# Patient Record
Sex: MALE | Race: BLACK OR AFRICAN AMERICAN | Employment: FULL TIME | URBAN - METROPOLITAN AREA
[De-identification: names, ages, dates, MRNs, and addresses within clinical notes are randomized per-mention and may not be internally consistent; named-entity substitution may affect disease eponyms.]

---

## 2018-02-27 ENCOUNTER — OFFICE VISIT (OUTPATIENT)
Dept: FAMILY MEDICINE | Facility: CLINIC | Age: 33
End: 2018-02-27
Payer: COMMERCIAL

## 2018-02-27 VITALS
TEMPERATURE: 97.2 F | HEART RATE: 75 BPM | DIASTOLIC BLOOD PRESSURE: 75 MMHG | WEIGHT: 166 LBS | BODY MASS INDEX: 26.06 KG/M2 | HEIGHT: 67 IN | SYSTOLIC BLOOD PRESSURE: 120 MMHG | OXYGEN SATURATION: 100 %

## 2018-02-27 DIAGNOSIS — R10.32 ABDOMINAL PAIN, LEFT LOWER QUADRANT: Primary | ICD-10-CM

## 2018-02-27 PROCEDURE — 99213 OFFICE O/P EST LOW 20 MIN: CPT | Performed by: FAMILY MEDICINE

## 2018-02-27 RX ORDER — POLYETHYLENE GLYCOL 3350 17 G/17G
1 POWDER, FOR SOLUTION ORAL DAILY
Qty: 510 G | Refills: 1 | Status: SHIPPED | OUTPATIENT
Start: 2018-02-27 | End: 2019-10-25

## 2018-02-27 ASSESSMENT — PAIN SCALES - GENERAL: PAINLEVEL: WORST PAIN (10)

## 2018-02-27 NOTE — MR AVS SNAPSHOT
"              After Visit Summary   2018    Steven Rubio    MRN: [id number]           Patient Information     Date Of Birth          1985        Visit Information        Provider Department      2018 10:40 AM Colin Denton MD [facility]        Today's Diagnoses     Abdominal pain, left lower quadrant    -  1       Follow-ups after your visit        Who to contact     If you have questions or need follow up information about today's clinic visit or your schedule please contact [facility] directly at [phone].  Normal or non-critical lab and imaging results will be communicated to you by [employer], letter or phone within 4 business days after the clinic has received the results. If you do not hear from us within 7 days, please contact the clinic through [employer] or phone. If you have a critical or abnormal lab result, we will notify you by phone as soon as possible.  Submit refill requests through [employer] or call your pharmacy and they will forward the refill request to us. Please allow 3 business days for your refill to be completed.          Additional Information About Your Visit        MyChart Information     [employer] lets you send messages to your doctor, view your test results, renew your prescriptions, schedule appointments and more. To sign up, go to [email or web address] . Click on \"Log in\" on the left side of the screen, which will take you to the Welcome page. Then click on \"Sign up Now\" on the right side of the page.     You will be asked to enter the access code listed below, as well as some personal information. Please follow the directions to create your username and password.     Your access code is: [email or web address]  Expires: 2018 12:20 PM     Your access code will  in 90 days. If you need help or a new code, please call your [facility] or [phone].        Care EveryWhere ID     This is your Care EveryWhere ID. " "This could be used by other organizations to access your [place] medical records  [id number]        Your Vitals Were     Pulse Temperature Height Pulse Oximetry BMI (Body Mass Index)       75 97.2  F (36.2  C) (Oral) 5' 7.25\" (1.708 m) 100% 25.81 kg/m2        Blood Pressure from Last 3 Encounters:   02/27/18 120/75    Weight from Last 3 Encounters:   02/27/18 166 lb (75.3 kg)              Today, you had the following     No orders found for display         Today's Medication Changes          These changes are accurate as of 2/27/18 12:20 PM.  If you have any questions, ask your nurse or doctor.               Start taking these medicines.        Dose/Directions    polyethylene glycol powder   Commonly known as:  MIRALAX   Used for:  Abdominal pain, left lower quadrant   Started by:  Colin Denton MD        Dose:  1 capful   Take 17 g (1 capful) by mouth daily   Quantity:  510 g   Refills:  1            Where to get your medicines      These medications were sent to [facility] [address] [place] [address]     Phone:  [phone]     polyethylene glycol powder                Primary Care Provider Office Phone # Fax #    [facility] [phone]       [address] [place] [address]        Equal Access to Services     ARNAV INMAN AH: Norberto torrez hadasho Soomaali, waaxda luqadaha, qaybta kaalmada adeegyada, alondra tong. So [facility] [phone].    ATENCIÓN: Si habla español, tiene a horta disposición servicios gratuitos de asistencia lingüística. Llame al [phone].    We comply with applicable federal civil rights laws and [place] laws. We do not discriminate on the basis of race, color, national origin, age, disability, sex, sexual orientation, or gender identity.            Thank you!     Thank you for choosing [facility]  for your care. " Our goal is always to provide you with excellent care. Hearing back from our patients is one way we can continue to improve our services. Please take a few minutes to complete the written survey that you may receive in the mail after your visit with us. Thank you!             Your Updated Medication List - Protect others around you: Learn how to safely use, store and throw away your medicines at www.disposemymeds.org.          This list is accurate as of 2/27/18 12:20 PM.  Always use your most recent med list.                   Brand Name Dispense Instructions for use Diagnosis    latanoprost 0.005 % ophthalmic solution    XALATAN    1 Bottle    Place 1 drop Into the left eye At Bedtime.    Open-angle glaucoma       polyethylene glycol powder    MIRALAX    510 g    Take 17 g (1 capful) by mouth daily    Abdominal pain, left lower quadrant

## 2018-02-27 NOTE — NURSING NOTE
"Chief Complaint   Patient presents with     Abdominal Pain       Initial /75 (BP Location: Right arm, Patient Position: Sitting, Cuff Size: Adult Regular)  Pulse 75  Temp 97.2  F (36.2  C) (Oral)  Ht 5' 7.25\" (1.708 m)  Wt 166 lb (75.3 kg)  SpO2 100%  BMI 25.81 kg/m2 Estimated body mass index is 25.81 kg/(m^2) as calculated from the following:    Height as of this encounter: 5' 7.25\" (1.708 m).    Weight as of this encounter: 166 lb (75.3 kg).  Medication Reconciliation: complete   Cherie Ayala MA      "

## 2018-02-27 NOTE — PROGRESS NOTES
"  SUBJECTIVE:   Steven Rubio is a 33 year old male who presents to clinic today for the following health issues:    ABDOMINAL PAIN - LLQ     Onset: on/off in the mornings x 2-3 mo    Description:   Character: Dull ache  Location: left lower quadrant  Radiation: None    Intensity: moderate - 10/10 at it's worst    Progression of Symptoms:  same    Accompanying Signs & Symptoms:  Fever/Chills?: no   Gas/Bloating: no   Nausea: no   Vomitting: no   Diarrhea?: no   Constipation:no   Dysuria or Hematuria: no    History:   Trauma: no   Previous similar pain: no    Previous tests done: none    Precipitating factors:   Does the pain change with:     Food: no      BM: no     Urination: no     Alleviating factors:  Rest / Laying flat with a pillow on it    Therapies Tried and outcome: None         Problem list and histories reviewed & adjusted, as indicated.  Additional history: as documented    Current Outpatient Prescriptions   Medication Sig Dispense Refill     latanoprost (XALATAN) 0.005 % ophthalmic solution Place 1 drop Into the left eye At Bedtime. 1 Bottle 9     No Known Allergies  No lab results found.   BP Readings from Last 3 Encounters:   02/27/18 120/75    Wt Readings from Last 3 Encounters:   02/27/18 166 lb (75.3 kg)           Patient being followed by ophthalmology for glaucoma   Possible surgery pending     Ros: no chest pain, chest tightness   No sob   No leg edema   abdominal pain as noted above     Denies fever or chills   Weight stable           Patient uses Ibuprofen infrequently   No aspirin     Patient has not tried antacids     He has been taking his eye doctor   They are considering surgery   Vision are ok      O: /75 (BP Location: Right arm, Patient Position: Sitting, Cuff Size: Adult Regular)  Pulse 75  Temp 97.2  F (36.2  C) (Oral)  Ht 5' 7.25\" (1.708 m)  Wt 166 lb (75.3 kg)  SpO2 100%  BMI 25.81 kg/m2    Head: Normocephalic, atraumatic.  Eyes: Conjunctiva clear, non icteric. " PERRLA.  Ears: External ears and TMs normal BL.  Nose: Septum midline, nasal mucosa pink and moist. No discharge.  Mouth / Throat: Normal dentition.  No oral lesions. Pharynx non erythematous, tonsils without hypertrophy.  Neck: Supple, no enlarged LN, trachea midline.    Chest wall normal to inspection and palpation. Good excursion bilaterally. Lungs clear to auscultation. Good air movement bilaterally without rales, wheezes, or rhonchi.   Regular rate and  rhythm. S1 and S2 normal, no murmurs, clicks, gallops or rubs. No edema or JVD.    The abdomen is soft without tenderness, guarding, mass, rebound or organomegaly. Bowel sounds are normal. No CVA tenderness or inguinal adenopathy noted.      ICD-10-CM    1. Abdominal pain, left lower quadrant R10.32 polyethylene glycol (MIRALAX) powder     Symptoms most suggestive of IBS   Doubt gastritis or ulcer   Intermittent, no relief from food ; no aggravating factors   Goes away in less than 2 hours ; no blood in stool       Reviewed and updated as needed this visit by clinical staff       Reviewed and updated as needed this visit by Provider

## 2018-03-12 ENCOUNTER — TELEPHONE (OUTPATIENT)
Dept: FAMILY MEDICINE | Facility: CLINIC | Age: 33
End: 2018-03-12

## 2018-03-12 NOTE — TELEPHONE ENCOUNTER
Called patient.  He stated that he has had burning with urination for one day.  It comes and goes.  Denies any blood in his urine, flank pain, abdominal pain, fevers or chills.  Advised to be seen.  Appointment made for tomorrow with Dr. Denton.  Patient stated understanding and agreeable with the plan of care. Aiyana Mcmanus, RN-BSN, [facility] Triage.

## 2018-03-12 NOTE — PROGRESS NOTES
SUBJECTIVE:   Steven Rubio is a 33 year old male who presents to clinic today for the following health issues:    Genitourinary - Male  Onset: x 3 days    Description:   Dysuria (painful urination): YES - More in the morning  Hematuria (blood in urine): no   Frequency: YES  Are you urinating at night : no   Hesitancy (delay in urine): no   Retention (unable to empty): no   Decrease in urinary flow: no   Incontinence: no     Progression of Symptoms:  improving    Accompanying Signs & Symptoms:  Fever: no   Back/Flank pain: no   Urethral discharge: YES  Testicle lumps/masses/pain: no   Nausea and/or vomiting: no   Abdominal pain: YES    History:   History of frequent UTI's: no   History of kidney stones: no   History of hernias: no   Personal or Family history of Prostate problems: no  Sexually active: YES    Alleviating factors:  None    New sexual contact on Friday     Problem list and histories reviewed & adjusted, as indicated.      Last time urinated     Patient had oral sex and then used a condom during intercourse     O: /77 (BP Location: Right arm, Patient Position: Sitting, Cuff Size: Adult Regular)  Pulse 81  Temp 97.1  F (36.2  C) (Oral)  Wt 172 lb (78 kg)  SpO2 100%  BMI 26.74 kg/m2    No visible lesion on the penis   He has a slight discharge noted on exam today       ICD-10-CM    1. Dysuria R30.0

## 2018-03-12 NOTE — TELEPHONE ENCOUNTER
Reason for call:  Patient reporting a symptom    Symptom or request: UTI     Duration (how long have symptoms been present): Yesterday     Have you been treated for this before? No    Additional comments: Wanting to know if he should be coming in for an appointment. He said that he has had burning when he urinates. He is wanting to know what he should be doing. Please call and advise.     Phone Number patient can be reached at:  Home number on file [phone] (home)    Best Time:  Anytime     Can we leave a detailed message on this number:  YES    Call taken on 3/12/2018 at 5:12 PM by Tiffanie Angeles

## 2018-03-13 ENCOUNTER — OFFICE VISIT (OUTPATIENT)
Dept: FAMILY MEDICINE | Facility: CLINIC | Age: 33
End: 2018-03-13
Payer: COMMERCIAL

## 2018-03-13 VITALS
HEART RATE: 81 BPM | WEIGHT: 172 LBS | TEMPERATURE: 97.1 F | BODY MASS INDEX: 26.74 KG/M2 | DIASTOLIC BLOOD PRESSURE: 77 MMHG | OXYGEN SATURATION: 100 % | SYSTOLIC BLOOD PRESSURE: 125 MMHG

## 2018-03-13 DIAGNOSIS — R30.0 DYSURIA: Primary | ICD-10-CM

## 2018-03-13 PROCEDURE — 99213 OFFICE O/P EST LOW 20 MIN: CPT | Performed by: FAMILY MEDICINE

## 2018-03-13 PROCEDURE — 87491 CHLMYD TRACH DNA AMP PROBE: CPT | Performed by: FAMILY MEDICINE

## 2018-03-13 PROCEDURE — 87591 N.GONORRHOEAE DNA AMP PROB: CPT | Performed by: FAMILY MEDICINE

## 2018-03-13 RX ORDER — AZITHROMYCIN 500 MG/1
1000 TABLET, FILM COATED ORAL ONCE
Qty: 2 TABLET | Refills: 0 | Status: SHIPPED | OUTPATIENT
Start: 2018-03-13 | End: 2018-03-13

## 2018-03-13 NOTE — MR AVS SNAPSHOT
"              After Visit Summary   3/13/2018    Steven Rubio    MRN: [id number]           Patient Information     Date Of Birth          1985        Visit Information        Provider Department      3/13/2018 11:00 AM Colin Denton MD [facility]        Today's Diagnoses     Dysuria    -  1       Follow-ups after your visit        Who to contact     If you have questions or need follow up information about today's clinic visit or your schedule please contact [facility] directly at [phone].  Normal or non-critical lab and imaging results will be communicated to you by [employer], letter or phone within 4 business days after the clinic has received the results. If you do not hear from us within 7 days, please contact the clinic through [employer] or phone. If you have a critical or abnormal lab result, we will notify you by phone as soon as possible.  Submit refill requests through [employer] or call your pharmacy and they will forward the refill request to us. Please allow 3 business days for your refill to be completed.          Additional Information About Your Visit        MyChart Information     [employer] lets you send messages to your doctor, view your test results, renew your prescriptions, schedule appointments and more. To sign up, go to [email or web address] . Click on \"Log in\" on the left side of the screen, which will take you to the Welcome page. Then click on \"Sign up Now\" on the right side of the page.     You will be asked to enter the access code listed below, as well as some personal information. Please follow the directions to create your username and password.     Your access code is: [email or web address]  Expires: 2018  1:20 PM     Your access code will  in 90 days. If you need help or a new code, please call your [facility] or [phone].        Care EveryWhere ID     This is your Care EveryWhere ID. This could be used by other " organizations to access your [place] medical records  [id number]        Your Vitals Were     Pulse Temperature Pulse Oximetry BMI (Body Mass Index)          81 97.1  F (36.2  C) (Oral) 100% 26.74 kg/m2         Blood Pressure from Last 3 Encounters:   03/13/18 125/77   02/27/18 120/75    Weight from Last 3 Encounters:   03/13/18 172 lb (78 kg)   02/27/18 166 lb (75.3 kg)              We Performed the Following     Chlamydia trachomatis PCR     Neisseria gonorrhoeae PCR          Today's Medication Changes          These changes are accurate as of 3/13/18 11:26 AM.  If you have any questions, ask your nurse or doctor.               Start taking these medicines.        Dose/Directions    azithromycin 500 MG tablet   Commonly known as:  ZITHROMAX   Used for:  Dysuria   Started by:  Colin Denton MD        Dose:  1000 mg   Take 2 tablets (1,000 mg) by mouth once for 1 dose   Quantity:  2 tablet   Refills:  0            Where to get your medicines      These medications were sent to [facility] [id number]     Phone:  [phone]     azithromycin 500 MG tablet                Primary Care Provider Office Phone # Fax #    [facility] [phone]       [address] [place] [address]        Equal Access to Services     ARNAV INMAN AH: Norberto johnson ku hadasho Soomaali, waaxda luqadaha, qaybta kaalmada adeegyada, alondra tong. So [facility] [phone].    ATENCIÓN: Si habla español, tiene a horta disposición servicios gratuitos de asistencia lingüística. Llame al [phone].    We comply with applicable federal civil rights laws and [place] laws. We do not discriminate on the basis of race, color, national origin, age, disability, sex, sexual orientation, or gender identity.            Thank you!     Thank you for choosing [facility]  for  your care. Our goal is always to provide you with excellent care. Hearing back from our patients is one way we can continue to improve our services. Please take a few minutes to complete the written survey that you may receive in the mail after your visit with us. Thank you!             Your Updated Medication List - Protect others around you: Learn how to safely use, store and throw away your medicines at www.disposemymeds.org.          This list is accurate as of 3/13/18 11:26 AM.  Always use your most recent med list.                   Brand Name Dispense Instructions for use Diagnosis    azithromycin 500 MG tablet    ZITHROMAX    2 tablet    Take 2 tablets (1,000 mg) by mouth once for 1 dose    Dysuria       latanoprost 0.005 % ophthalmic solution    XALATAN    1 Bottle    Place 1 drop Into the left eye At Bedtime.    Open-angle glaucoma       polyethylene glycol powder    MIRALAX    510 g    Take 17 g (1 capful) by mouth daily    Abdominal pain, left lower quadrant

## 2018-03-13 NOTE — NURSING NOTE
"Chief Complaint   Patient presents with     UTI     ?       Initial /77 (BP Location: Right arm, Patient Position: Sitting, Cuff Size: Adult Regular)  Pulse 81  Temp 97.1  F (36.2  C) (Oral)  Wt 172 lb (78 kg)  SpO2 100%  BMI 26.74 kg/m2 Estimated body mass index is 26.74 kg/(m^2) as calculated from the following:    Height as of 2/27/18: 5' 7.25\" (1.708 m).    Weight as of this encounter: 172 lb (78 kg).  Medication Reconciliation: complete   Cherie Ayala MA      "

## 2018-03-14 LAB
C TRACH DNA SPEC QL NAA+PROBE: NEGATIVE
N GONORRHOEA DNA SPEC QL NAA+PROBE: POSITIVE
SPECIMEN SOURCE: ABNORMAL
SPECIMEN SOURCE: NORMAL

## 2018-03-15 ENCOUNTER — OFFICE VISIT (OUTPATIENT)
Dept: FAMILY MEDICINE | Facility: CLINIC | Age: 33
End: 2018-03-15
Payer: COMMERCIAL

## 2018-03-15 ENCOUNTER — TELEPHONE (OUTPATIENT)
Dept: FAMILY MEDICINE | Facility: CLINIC | Age: 33
End: 2018-03-15

## 2018-03-15 DIAGNOSIS — A54.9 GONORRHEA: Primary | ICD-10-CM

## 2018-03-15 PROCEDURE — 96372 THER/PROPH/DIAG INJ SC/IM: CPT | Performed by: FAMILY MEDICINE

## 2018-03-15 PROCEDURE — 99212 OFFICE O/P EST SF 10 MIN: CPT | Mod: 25 | Performed by: FAMILY MEDICINE

## 2018-03-15 RX ORDER — CEFTRIAXONE SODIUM 250 MG/1
250 INJECTION, POWDER, FOR SOLUTION INTRAMUSCULAR; INTRAVENOUS ONCE
Qty: 1.25 ML | Refills: 0 | OUTPATIENT
Start: 2018-03-15 | End: 2018-03-15

## 2018-03-15 NOTE — MR AVS SNAPSHOT
"              After Visit Summary   3/15/2018    Steven Rubio    MRN: [id number]           Patient Information     Date Of Birth          1985        Visit Information        Provider Department      3/15/2018 4:00 PM Colin Denton MD [facility]        Today's Diagnoses     Gonorrhea    -  1       Follow-ups after your visit        Who to contact     If you have questions or need follow up information about today's clinic visit or your schedule please contact [facility] directly at [phone].  Normal or non-critical lab and imaging results will be communicated to you by [email or web address]hart, letter or phone within 4 business days after the clinic has received the results. If you do not hear from us within 7 days, please contact the clinic through [email or web address]hart or phone. If you have a critical or abnormal lab result, we will notify you by phone as soon as possible.  Submit refill requests through [employer] or call your pharmacy and they will forward the refill request to us. Please allow 3 business days for your refill to be completed.          Additional Information About Your Visit        MyChart Information     [employer] lets you send messages to your doctor, view your test results, renew your prescriptions, schedule appointments and more. To sign up, go to [email or web address] . Click on \"Log in\" on the left side of the screen, which will take you to the Welcome page. Then click on \"Sign up Now\" on the right side of the page.     You will be asked to enter the access code listed below, as well as some personal information. Please follow the directions to create your username and password.     Your access code is: [email or web address]  Expires: 2018  1:20 PM     Your access code will  in 90 days. If you need help or a new code, please call your [facility] or [phone].        Care EveryWhere ID     This is your Care EveryWhere ID. This could be used by " other organizations to access your [place] medical records  [id number]         Blood Pressure from Last 3 Encounters:   03/13/18 125/77   02/27/18 120/75    Weight from Last 3 Encounters:   03/13/18 172 lb (78 kg)   02/27/18 166 lb (75.3 kg)              We Performed the Following     CEFTRIAXONE NA INJ /250MG          Today's Medication Changes          These changes are accurate as of 3/15/18  5:03 PM.  If you have any questions, ask your nurse or doctor.               Start taking these medicines.        Dose/Directions    cefTRIAXone 250 MG injection   Commonly known as:  ROCEPHIN   Used for:  Gonorrhea   Started by:  Colin Denton MD        Dose:  250 mg   Inject 250 mg into the muscle once for 1 dose   Quantity:  1.25 mL   Refills:  0            Where to get your medicines      Some of these will need a paper prescription and others can be bought over the counter.  Ask your nurse if you have questions.     You don't need a prescription for these medications     cefTRIAXone 250 MG injection                Primary Care Provider Office Phone # Fax #    [facility] [phone]       [address] [place] [address]        Equal Access to Services     ARNAV INMAN AH: Hadii elizabeth lestero Somargarita, waaxda luqadaha, qaybta kaalmada adeolena, alondra tong. So [facility] [phone].    ATENCIÓN: Si habla español, tiene a horta disposición servicios gratuitos de asistencia lingüística. Llame al [phone].    We comply with applicable federal civil rights laws and [place] laws. We do not discriminate on the basis of race, color, national origin, age, disability, sex, sexual orientation, or gender identity.            Thank you!     Thank you for choosing [facility]  for your care. Our goal is always to provide you with excellent care. Hearing back from our patients is one way we can continue to improve our  services. Please take a few minutes to complete the written survey that you may receive in the mail after your visit with us. Thank you!             Your Updated Medication List - Protect others around you: Learn how to safely use, store and throw away your medicines at www.disposemymeds.org.          This list is accurate as of 3/15/18  5:03 PM.  Always use your most recent med list.                   Brand Name Dispense Instructions for use Diagnosis    cefTRIAXone 250 MG injection    ROCEPHIN    1.25 mL    Inject 250 mg into the muscle once for 1 dose    Gonorrhea       latanoprost 0.005 % ophthalmic solution    XALATAN    1 Bottle    Place 1 drop Into the left eye At Bedtime.    Open-angle glaucoma       polyethylene glycol powder    MIRALAX    510 g    Take 17 g (1 capful) by mouth daily    Abdominal pain, left lower quadrant

## 2018-03-15 NOTE — PROGRESS NOTES
SUBJECTIVE:   Steven Rubio is a 33 year old male who presents to clinic today for the following health issues:      Patient is here for a Rocephin 250 mg Injection    Patient came in today for follow-up on his positive test for gonorrhea.  He actually is ready been treated with azithromycin and is improved.  He has not contacted the sexual partner at this point.  He wanted to know how long the tick for the incubation period after exposure.    O; There were no vitals taken for this visit.      ICD-10-CM    1. Gonorrhea A54.9 cefTRIAXone (ROCEPHIN) 250 MG injection     CEFTRIAXONE NA INJ /250MG         P; patient was told to be contacted by the health department to prove that he was treated, and possibly he would have to give information about his sexual contact.  He states if the contact was in the last week he would know who that is.

## 2018-03-15 NOTE — NURSING NOTE
The following medication was given:     MEDICATION: Rocephin 250mg and Lidocaine 0.9cc  ROUTE: IM  SITE: LUQ - Gluteus  DOSE: 1 ml  LOT #: [id number]  :  Jenny  EXPIRATION DATE:  2/1/20  NDC#: 6143-2649-02       Due to injection administration, patient instructed to remain in clinic for 20 minutes  afterwards, and to report any adverse reaction to me immediately.    Cherie Ayala MA

## 2019-01-03 ENCOUNTER — OFFICE VISIT (OUTPATIENT)
Dept: FAMILY MEDICINE | Facility: CLINIC | Age: 34
End: 2019-01-03
Payer: COMMERCIAL

## 2019-01-03 VITALS
DIASTOLIC BLOOD PRESSURE: 83 MMHG | SYSTOLIC BLOOD PRESSURE: 130 MMHG | WEIGHT: 171 LBS | TEMPERATURE: 97.6 F | BODY MASS INDEX: 26.58 KG/M2 | OXYGEN SATURATION: 100 % | HEART RATE: 71 BPM
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
## 2019-01-03 DIAGNOSIS — M54.2 ACUTE NECK PAIN: ICD-10-CM

## 2019-01-03 DIAGNOSIS — S16.1XXA STRAIN OF NECK MUSCLE, INITIAL ENCOUNTER: ICD-10-CM

## 2019-01-03 PROCEDURE — 99214 OFFICE O/P EST MOD 30 MIN: CPT | Performed by: FAMILY MEDICINE

## 2019-01-03 RX ORDER — PREDNISONE 20 MG/1
TABLET ORAL
Qty: 15 TABLET | Refills: 0 | Status: SHIPPED | OUTPATIENT
Start: 2019-01-03 | End: 2019-10-25

## 2019-01-03 ASSESSMENT — PAIN SCALES - GENERAL: PAINLEVEL: WORST PAIN (10)

## 2019-01-04 NOTE — PATIENT INSTRUCTIONS
Patient Education     Understanding Cervical Strain    There are 7 bones (vertebrae) in the neck that are part of the spine. These are called the cervical spine. Cervical strain is a medical term for neck pain. The neck has several layers of muscles. These are connected with tendons to the cervical spine and other bones. Neck pain is often the result of injury to these muscles and tendons.  Causes of cervical strain  Different types of stress on the neck can damage muscles and tendons (soft tissues) and cause cervical strain. Cervical tissues can be damaged by:    The neck being forced past its normal range of motion, such as in a car accident or sports injury    Constant, low-level stress, such as from poor posture or a poorly set-up workspace  Symptoms of cervical strain  These may include:    Neck pain or stiffness    Pain in the shoulders or upper back    Muscle spasms    Headache, often starting at the base of the neck    Irritability, difficulty concentrating, or sleeplessness  Treatment for cervical strain  This problem often gets better on its own. Treatments aim to reduce pain and inflammation and increase the range of motion of the neck. Possible treatments include:    Over-the-counter or prescription pain medicine. These help relieve pain and inflammation.    Stretching exercises to decrease neck stiffness.    Massage to decrease neck stiffness.    Cold or heat pack. These help reduce pain and swelling.  Call 911  Call 911 right away if you have any of these:    Face drooping or numbness    Numbness or weakness, especially in the arms or on one side    Slurred speech or difficulty speaking    Blurred vision   When to call your healthcare provider  Call your healthcare provider right away if you have any of these:    Fever of 100.4 F (38 C) or higher, or as directed    Pain or stiffness that gets worse    Symptoms that don t get better, or get worse    Numbness, tingling, weakness or shooting pains into  the arms or legs    New symptoms  Date Last Reviewed: 3/10/2016    [id number] [employer]. [address] [place] [id number]. All rights reserved. This information is not intended as a substitute for professional medical care. Always follow your healthcare professional's instructions.           Patient Education     Neck Exercises: Active Neck Rotation    To start, lie on your back, knees bent and feet flat on the floor. Keep your ears, shoulders, and hips aligned, but don t press your lower back to the floor. Rest your hands on your pelvis. Breathe deeply and relax.  Here are the steps for the active neck rotation:    Use your neck muscles to turn your head to one side until you feel a stretch in the muscles.    Hold for 5 seconds. Then turn to the other side.    Repeat 5 times on each side.  Note: Keep your shoulders on the floor. Don t lift or tuck your chin as you turn your head.  Date Last Reviewed: 11/1/2017 2000-2018 [employer]. [address] [place] [id number]. All rights reserved. This information is not intended as a substitute for professional medical care. Always follow your healthcare professional's instructions.           Patient Education     Reach and Hold Exercise       Do this exercise on your hands and knees. Keep your knees under your hips and your hands under your shoulders. Keep your spine in a neutral position (not arched or sagging). Keep your ears in line with your shoulders. Hold for a few seconds before starting the exercise:  1. Tighten your belly muscles and raise one arm straight in front of you, palm down. Hold for 5 seconds, then lower. Repeat 5 times.  2. Do the exercise again, this time lifting your arm to the side. Repeat 5 times.  3. Do the exercise again, this time lifting your arm backward, palm up. Repeat 5 times.  Switch sides and do each exercise with the other arm.  Date Last Reviewed: 11/1/2017 2000-2018 [employer]  [employer]. [address] [place] [address]. All rights reserved. This information is not intended as a substitute for professional medical care. Always follow your healthcare professional's instructions.           Patient Education     Back Exercises: Back Release  Do this exercise on your hands and knees. Keep your knees under your hips and your hands under your shoulders.        Relax your abdominal and buttocks muscles, lift your head, and let your back sag. Be sure to keep your weight evenly distributed. Don t sit back on your hips.     Hold for 5 seconds.    Return to starting position.    Tuck your head and lift (arch) your back.    Hold for 5 seconds    Return to starting position.    Repeat 5 times.  Date Last Reviewed: 3/1/2018    [id number] [employer]. [address] [place] [address]. All rights reserved. This information is not intended as a substitute for professional medical care. Always follow your healthcare professional's instructions.           Patient Education     Lumbar Stretch (Flexibility)    1. Lie on your back on the floor, with your knees bent and your feet flat on the floor. Don t press your neck or lower back to the floor.  2. Pull one knee up toward your chest. Clasp your hands under your thigh to help pull.  3. Hold for 30 to 60 seconds. Lower your leg back down to the floor.  4. Repeat 2 times, or as instructed.  5. Switch legs and repeat.  Date Last Reviewed: 3/10/2016    [id number] [employer]. [address] [place] [address]. All rights reserved. This information is not intended as a substitute for professional medical care. Always follow your healthcare professional's instructions.           Patient Education     Lumbar Extension (Flexibility)    1. Lie face down on your stomach, forehead on the floor. You can lie on a mat or towel.  2. Bend your arms next to your body and lift your upper body up on your forearms. Your  palms and forearms should be flat on the floor. Keep your stomach and hips on the floor.  3. Hold your upper body up with your forearms for 20 seconds. Slowly lower back down to the floor.  4. Repeat 2 times, or as instructed.  Date Last Reviewed: 3/10/2016    [id number] [employer]. [address] [place] [address], [place] [address]. All rights reserved. This information is not intended as a substitute for professional medical care. Always follow your healthcare professional's instructions.
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
## 2019-04-23 ENCOUNTER — THERAPY VISIT (OUTPATIENT)
Dept: PHYSICAL THERAPY | Facility: CLINIC | Age: 34
End: 2019-04-23
Payer: COMMERCIAL

## 2019-04-23 DIAGNOSIS — M54.50 ACUTE BILATERAL LOW BACK PAIN WITHOUT SCIATICA: ICD-10-CM

## 2019-04-23 DIAGNOSIS — M54.2 NECK PAIN: ICD-10-CM

## 2019-04-23 DIAGNOSIS — M25.511 ACUTE PAIN OF RIGHT SHOULDER: ICD-10-CM

## 2019-04-23 PROCEDURE — 97162 PT EVAL MOD COMPLEX 30 MIN: CPT | Mod: GP | Performed by: PHYSICAL THERAPIST

## 2019-04-23 PROCEDURE — 97112 NEUROMUSCULAR REEDUCATION: CPT | Mod: GP | Performed by: PHYSICAL THERAPIST

## 2019-04-23 PROCEDURE — 97110 THERAPEUTIC EXERCISES: CPT | Mod: GP | Performed by: PHYSICAL THERAPIST
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
## 2019-04-25 ENCOUNTER — THERAPY VISIT (OUTPATIENT)
Dept: PHYSICAL THERAPY | Facility: CLINIC | Age: 34
End: 2019-04-25
Payer: COMMERCIAL

## 2019-04-25 DIAGNOSIS — M54.50 ACUTE BILATERAL LOW BACK PAIN WITHOUT SCIATICA: Primary | ICD-10-CM

## 2019-04-25 DIAGNOSIS — M25.511 ACUTE PAIN OF RIGHT SHOULDER: ICD-10-CM

## 2019-04-25 DIAGNOSIS — M54.2 NECK PAIN: ICD-10-CM

## 2019-04-25 PROCEDURE — 97530 THERAPEUTIC ACTIVITIES: CPT | Mod: GP | Performed by: PHYSICAL THERAPIST

## 2019-04-25 PROCEDURE — 97110 THERAPEUTIC EXERCISES: CPT | Mod: GP | Performed by: PHYSICAL THERAPIST

## 2019-04-30 ENCOUNTER — THERAPY VISIT (OUTPATIENT)
Dept: PHYSICAL THERAPY | Facility: CLINIC | Age: 34
End: 2019-04-30
Payer: COMMERCIAL

## 2019-04-30 DIAGNOSIS — M54.50 ACUTE BILATERAL LOW BACK PAIN WITHOUT SCIATICA: Primary | ICD-10-CM

## 2019-04-30 DIAGNOSIS — M54.2 NECK PAIN: ICD-10-CM

## 2019-04-30 DIAGNOSIS — M25.511 ACUTE PAIN OF RIGHT SHOULDER: ICD-10-CM

## 2019-04-30 PROCEDURE — 97530 THERAPEUTIC ACTIVITIES: CPT | Mod: GP | Performed by: PHYSICAL THERAPIST

## 2019-04-30 PROCEDURE — 97110 THERAPEUTIC EXERCISES: CPT | Mod: GP | Performed by: PHYSICAL THERAPIST

## 2019-05-02 ENCOUNTER — THERAPY VISIT (OUTPATIENT)
Dept: PHYSICAL THERAPY | Facility: CLINIC | Age: 34
End: 2019-05-02
Payer: COMMERCIAL

## 2019-05-02 DIAGNOSIS — M54.50 ACUTE BILATERAL LOW BACK PAIN WITHOUT SCIATICA: Primary | ICD-10-CM

## 2019-05-02 DIAGNOSIS — M25.511 ACUTE PAIN OF RIGHT SHOULDER: ICD-10-CM

## 2019-05-02 DIAGNOSIS — M54.2 NECK PAIN: ICD-10-CM

## 2019-05-02 PROCEDURE — 97110 THERAPEUTIC EXERCISES: CPT | Mod: GP | Performed by: PHYSICAL THERAPIST

## 2019-05-02 PROCEDURE — 97530 THERAPEUTIC ACTIVITIES: CPT | Mod: GP | Performed by: PHYSICAL THERAPIST

## 2019-05-02 PROCEDURE — 97140 MANUAL THERAPY 1/> REGIONS: CPT | Mod: GP | Performed by: PHYSICAL THERAPIST

## 2019-05-06 ENCOUNTER — THERAPY VISIT (OUTPATIENT)
Dept: PHYSICAL THERAPY | Facility: CLINIC | Age: 34
End: 2019-05-06
Payer: COMMERCIAL

## 2019-05-06 DIAGNOSIS — M25.511 ACUTE PAIN OF RIGHT SHOULDER: ICD-10-CM

## 2019-05-06 DIAGNOSIS — M54.50 ACUTE BILATERAL LOW BACK PAIN WITHOUT SCIATICA: Primary | ICD-10-CM

## 2019-05-06 DIAGNOSIS — M54.2 NECK PAIN: ICD-10-CM

## 2019-05-06 PROCEDURE — 97530 THERAPEUTIC ACTIVITIES: CPT | Mod: GP | Performed by: PHYSICAL THERAPIST

## 2019-05-06 PROCEDURE — 97110 THERAPEUTIC EXERCISES: CPT | Mod: GP | Performed by: PHYSICAL THERAPIST

## 2019-05-09 ENCOUNTER — THERAPY VISIT (OUTPATIENT)
Dept: PHYSICAL THERAPY | Facility: CLINIC | Age: 34
End: 2019-05-09
Payer: COMMERCIAL

## 2019-05-09 DIAGNOSIS — M54.2 NECK PAIN: ICD-10-CM

## 2019-05-09 DIAGNOSIS — M54.50 ACUTE BILATERAL LOW BACK PAIN WITHOUT SCIATICA: Primary | ICD-10-CM

## 2019-05-09 DIAGNOSIS — M25.511 ACUTE PAIN OF RIGHT SHOULDER: ICD-10-CM

## 2019-05-09 PROCEDURE — 97110 THERAPEUTIC EXERCISES: CPT | Mod: GP | Performed by: PHYSICAL THERAPIST

## 2019-05-09 PROCEDURE — 97530 THERAPEUTIC ACTIVITIES: CPT | Mod: GP | Performed by: PHYSICAL THERAPIST

## 2019-10-23 ENCOUNTER — NURSE TRIAGE (OUTPATIENT)
Dept: FAMILY MEDICINE | Facility: CLINIC | Age: 34
End: 2019-10-23

## 2019-10-23 NOTE — TELEPHONE ENCOUNTER
"    Additional Information    Negative: Passed out (i.e., fainted, collapsed and was not responding)    Negative: Shock suspected (e.g., cold/pale/clammy skin, too weak to stand, low BP, rapid pulse)    Negative: Sounds like a life-threatening emergency to the triager    Negative: Chest pain    Negative: Pain is mainly in upper abdomen (if needed ask: 'is it mainly above the belly button?')    Negative: SEVERE abdominal pain (e.g., excruciating)    Negative: Vomiting red blood or black (coffee ground) material    Negative: Bloody, black, or tarry bowel movements    Negative: Unable to urinate (or only a few drops) and bladder feels very full    Negative: Pain in scrotum persists > 1 hour    Negative: Constant abdominal pain lasting > 2 hours    Negative: Vomiting bile (green color)    Negative: Patient sounds very sick or weak to the triager    Negative: Vomiting and abdomen looks much more swollen than usual    Negative: White of the eyes have turned yellow (i.e., jaundice)    Negative: Blood in urine (red, pink, or tea-colored)    Negative: Fever > 103 F (39.4 C)    Negative: Fever > 101 F (38.3 C) and over 60 years of age    Negative: Fever > 100.0 F (37.8 C) and has diabetes mellitus or a weak immune system (e.g., HIV positive, cancer chemotherapy, organ transplant, splenectomy, chronic steroids)    Negative: Fever > 100.0 F (37.8 C) and bedridden (e.g., nursing home patient, stroke, chronic illness, recovering from surgery)    Negative: MILD pain that comes and goes (cramps) lasts > 24 hours    Negative: Age > 60 years    Mild abdominal pain    Abdominal pain is a chronic symptom (recurrent or ongoing AND lasting > 4 weeks)    Patient wants to be seen     No preference from being seen tomorrow or Friday    Answer Assessment - Initial Assessment Questions  1. LOCATION: \"Where does it hurt?\"       Left lower side  2. RADIATION: \"Does the pain shoot anywhere else?\" (e.g., chest, back)      No  3. ONSET: \"When did " "the pain begin?\" (Minutes, hours or days ago)       On and off for awhile   4. SUDDEN: \"Gradual or sudden onset?\"      Sudden but comes intermittently   5. PATTERN \"Does the pain come and go, or is it constant?\"     - If constant: \"Is it getting better, staying the same, or worsening?\"       (Note: Constant means the pain never goes away completely; most serious pain is constant and it progresses)      - If intermittent: \"How long does it last?\" \"Do you have pain now?\"      (Note: Intermittent means the pain goes away completely between bouts)      Intermittently for a long time   6. SEVERITY: \"How bad is the pain?\"  (e.g., Scale 1-10; mild, moderate, or severe)     - MILD (1-3): doesn't interfere with normal activities, abdomen soft and not tender to touch      - MODERATE (4-7): interferes with normal activities or awakens from sleep, tender to touch      - SEVERE (8-10): excruciating pain, doubled over, unable to do any normal activities        8/10  7. RECURRENT SYMPTOM: \"Have you ever had this type of abdominal pain before?\" If so, ask: \"When was the last time?\" and \"What happened that time?\"       Yes and was prescribed laxatives last year   8. CAUSE: \"What do you think is causing the abdominal pain?\"      Not sure   9. RELIEVING/AGGRAVATING FACTORS: \"What makes it better or worse?\" (e.g., movement, antacids, bowel movement)      Nothing changes it  10. OTHER SYMPTOMS: \"Has there been any vomiting, diarrhea, constipation, or urine problems?\"        No    Protocols used: ABDOMINAL PAIN - MALE-A-OH      "

## 2019-10-25 ENCOUNTER — OFFICE VISIT (OUTPATIENT)
Dept: FAMILY MEDICINE | Facility: CLINIC | Age: 34
End: 2019-10-25
Payer: COMMERCIAL

## 2019-10-25 ENCOUNTER — ANCILLARY PROCEDURE (OUTPATIENT)
Dept: GENERAL RADIOLOGY | Facility: CLINIC | Age: 34
End: 2019-10-25
Attending: FAMILY MEDICINE
Payer: COMMERCIAL

## 2019-10-25 VITALS
TEMPERATURE: 97.8 F | SYSTOLIC BLOOD PRESSURE: 123 MMHG | HEIGHT: 67 IN | BODY MASS INDEX: 26.37 KG/M2 | OXYGEN SATURATION: 100 % | DIASTOLIC BLOOD PRESSURE: 85 MMHG | WEIGHT: 168 LBS | HEART RATE: 73 BPM

## 2019-10-25 DIAGNOSIS — R10.32 ABDOMINAL PAIN, LEFT LOWER QUADRANT: ICD-10-CM

## 2019-10-25 DIAGNOSIS — R10.32 LLQ ABDOMINAL PAIN: Primary | ICD-10-CM

## 2019-10-25 DIAGNOSIS — K59.00 CONSTIPATION, UNSPECIFIED CONSTIPATION TYPE: ICD-10-CM

## 2019-10-25 DIAGNOSIS — R10.32 LLQ ABDOMINAL PAIN: ICD-10-CM

## 2019-10-25 LAB
ALBUMIN SERPL-MCNC: 4.2 G/DL (ref 3.4–5)
ALP SERPL-CCNC: 102 U/L (ref 40–150)
ALT SERPL W P-5'-P-CCNC: 34 U/L (ref 0–70)
ANION GAP SERPL CALCULATED.3IONS-SCNC: 8 MMOL/L (ref 3–14)
AST SERPL W P-5'-P-CCNC: 19 U/L (ref 0–45)
BILIRUB SERPL-MCNC: 0.5 MG/DL (ref 0.2–1.3)
BUN SERPL-MCNC: 12 MG/DL (ref 7–30)
CALCIUM SERPL-MCNC: 8.8 MG/DL (ref 8.5–10.1)
CHLORIDE SERPL-SCNC: 107 MMOL/L (ref 94–109)
CO2 SERPL-SCNC: 28 MMOL/L (ref 20–32)
CREAT SERPL-MCNC: 1.18 MG/DL (ref 0.66–1.25)
GFR SERPL CREATININE-BSD FRML MDRD: 79 ML/MIN/{1.73_M2}
GLUCOSE SERPL-MCNC: 91 MG/DL (ref 70–99)
POTASSIUM SERPL-SCNC: 3.8 MMOL/L (ref 3.4–5.3)
PROT SERPL-MCNC: 7.7 G/DL (ref 6.8–8.8)
SODIUM SERPL-SCNC: 143 MMOL/L (ref 133–144)

## 2019-10-25 PROCEDURE — 80053 COMPREHEN METABOLIC PANEL: CPT | Performed by: FAMILY MEDICINE

## 2019-10-25 PROCEDURE — 74019 RADEX ABDOMEN 2 VIEWS: CPT

## 2019-10-25 PROCEDURE — 36415 COLL VENOUS BLD VENIPUNCTURE: CPT | Performed by: FAMILY MEDICINE

## 2019-10-25 PROCEDURE — 99214 OFFICE O/P EST MOD 30 MIN: CPT | Performed by: FAMILY MEDICINE

## 2019-10-25 RX ORDER — POLYETHYLENE GLYCOL 3350 17 G/17G
1 POWDER, FOR SOLUTION ORAL DAILY
Qty: 850 G | Refills: 6 | Status: SHIPPED | OUTPATIENT
Start: 2019-10-25

## 2019-10-25 ASSESSMENT — MIFFLIN-ST. JEOR: SCORE: 1655.67

## 2019-10-25 ASSESSMENT — PAIN SCALES - GENERAL: PAINLEVEL: MILD PAIN (2)

## 2019-10-25 NOTE — PROGRESS NOTES
Subjective     Steven Rubio is a 34 year old male who presents to clinic today for the following health issues:    HPI   Patient comes in reports he has been having left lower quadrant pain for over a year now.  He reports previously he was told he may have constipation he reports he tried MiraLAX a few times in the past.  He reports the pain mostly in the left lower quadrant comes and goes.  He reports that comes comes sharp pain and burning sensation.  He reports that it comes last for a few hours.  He reports no fever, no nausea, no chills.  Does not throw up.  He denies any trauma to his abdomen, denies any diarrhea, denies any blood in his stool.  Denies any blood in the urine, denies pain with urination, denies previous history of kidney stone.  Denies change in his weight.  Has no fever no chills.  He denies constipation.  He does eat healthy.  Denies family history of colon cancer.    ABDOMINAL   PAIN     Onset: Ongoing for a few months    Description:   Character: Burning  Location: left lower quadrant  Radiation: None    Intensity: mild, moderate    Progression of Symptoms:  same and intermittent    Accompanying Signs & Symptoms:  Fever/Chills?: no   Gas/Bloating: no   Nausea: no   Vomitting: no   Diarrhea?: no   Constipation:no   Dysuria or Hematuria: no    History:   Trauma: no   Previous similar pain: YES   Previous tests done: none    Precipitating factors:   Does the pain change with:     Food: no      BM: no     Urination: no     Alleviating factors:  None    Therapies Tried and outcome: Nothing    LMP:  not applicable     Patient Active Problem List   Diagnosis     Open angle glaucoma, mild-mod, os     Corneal opacity     Acute bilateral low back pain without sciatica     Acute pain of right shoulder     Neck pain     History reviewed. No pertinent surgical history.    Social History     Tobacco Use     Smoking status: Never Smoker     Smokeless tobacco: Never Used   Substance Use Topics      Alcohol use: Yes     Comment: Social     Family History   Problem Relation Age of Onset     Glaucoma No family hx of      Macular Degeneration No family hx of          Current Outpatient Medications   Medication Sig Dispense Refill     latanoprost (XALATAN) 0.005 % ophthalmic solution Place 1 drop Into the left eye At Bedtime. 1 Bottle 9     polyethylene glycol (MIRALAX) powder Take 17 g (1 capful) by mouth daily 850 g 6     lidocaine (XYLOCAINE) 2 % external gel Apply topically as needed for moderate pain Apply to area qid as needed (Patient not taking: Reported on 10/25/2019) 30 mL 3     nabumetone (RELAFEN) 750 MG tablet Take 1 tablet (750 mg) by mouth 2 times daily (Patient not taking: Reported on 10/25/2019) 60 tablet 1     No Known Allergies  No lab results found.     Reviewed and updated as needed this visit by Provider         Review of Systems   ROS COMP: Constitutional, HEENT, cardiovascular, pulmonary, gi and gu systems are negative, except as otherwise noted.      Objective    There were no vitals taken for this visit.  There is no height or weight on file to calculate BMI.  Physical Exam   GENERAL: healthy, alert and no distress  NECK: no adenopathy, no asymmetry, masses, or scars and thyroid normal to palpation  RESP: lungs clear to auscultation - no rales, rhonchi or wheezes  CV: regular rate and rhythm, normal S1 S2, no S3 or S4, no murmur, click or rub, no peripheral edema and peripheral pulses strong  ABDOMEN: soft, nontender, no hepatosplenomegaly, no masses and bowel sounds normal  NO CVA Tenderness.  MS: no gross musculoskeletal defects noted, no edema    Diagnostic Test Results:  Labs reviewed in Epic  Orders Placed This Encounter   Procedures     XR Abdomen 2 Views     CT Abdomen Pelvis w Contrast     Comprehensive metabolic panel           Assessment & Plan     1. LLQ abdominal pain  I reviewed his x-ray results with him, which showed moderate amount of stool.  However there was no  "obstruction.  Patient will be scheduled to have an abdominal CT scan.  - XR Abdomen 2 Views; Future  - CT Abdomen Pelvis w Contrast; Future  - Comprehensive metabolic panel    2. Abdominal pain, left lower quadrant    - polyethylene glycol (MIRALAX) powder; Take 17 g (1 capful) by mouth daily  Dispense: 850 g; Refill: 6    3. Constipation, unspecified constipation type  Advised patient to decrease fiber intake, fluid intake, may use MiraLAX as needed.  - polyethylene glycol (MIRALAX) powder; Take 17 g (1 capful) by mouth daily  Dispense: 850 g; Refill: 6     BMI:   Estimated body mass index is 26.31 kg/m  as calculated from the following:    Height as of this encounter: 1.702 m (5' 7\").    Weight as of this encounter: 76.2 kg (168 lb).   Weight management plan: Discussed healthy diet and exercise guidelines    Follow-up for complete physical exam    There are no Patient Instructions on file for this visit.    No follow-ups on file.    Jac Bell MD  [facility]    "

## 2019-10-28 ENCOUNTER — TELEPHONE (OUTPATIENT)
Dept: FAMILY MEDICINE | Facility: CLINIC | Age: 34
End: 2019-10-28

## 2019-10-28 NOTE — TELEPHONE ENCOUNTER
Called and notified patient of the results below.    Reminded patient of his CT appointment in [place] on 10/31/19.      Patient stated understanding and agreeable with the plan of care. Aiyana Mcmanus,RN,BSN, [facility] Triage.

## 2019-10-28 NOTE — TELEPHONE ENCOUNTER
----- Message from Jac Bell MD sent at 10/28/2019  8:36 AM CDT -----  Please inform pt. Of his XR result  No acute finding  Somewhat irregular object showing in left abd. ( not clear what is it )  Please have pt. Do his Abd CT scan to find more  Was order and scheduled.   Please make sure pt. Will have it done.   thanks

## 2019-10-31 ENCOUNTER — ANCILLARY PROCEDURE (OUTPATIENT)
Dept: CT IMAGING | Facility: CLINIC | Age: 34
End: 2019-10-31
Attending: FAMILY MEDICINE
Payer: COMMERCIAL

## 2019-10-31 DIAGNOSIS — R10.32 LLQ ABDOMINAL PAIN: ICD-10-CM

## 2019-10-31 PROCEDURE — 74177 CT ABD & PELVIS W/CONTRAST: CPT | Mod: TC

## 2019-10-31 RX ORDER — IOPAMIDOL 755 MG/ML
200 INJECTION, SOLUTION INTRAVASCULAR ONCE
Status: COMPLETED | OUTPATIENT
Start: 2019-10-31 | End: 2019-10-31

## 2019-10-31 RX ADMIN — IOPAMIDOL 100 ML: 755 INJECTION, SOLUTION INTRAVASCULAR at 13:51

## 2019-10-31 RX ADMIN — Medication 100 ML: at 13:51

## 2019-11-01 ENCOUNTER — TELEPHONE (OUTPATIENT)
Dept: FAMILY MEDICINE | Facility: CLINIC | Age: 34
End: 2019-11-01

## 2019-11-01 DIAGNOSIS — N20.0 NEPHROLITHIASIS: Primary | ICD-10-CM

## 2019-11-01 DIAGNOSIS — N20.0 KIDNEY STONE: ICD-10-CM

## 2019-11-01 NOTE — TELEPHONE ENCOUNTER
----- Message from Jac Bell MD sent at 11/1/2019  8:23 AM CDT -----  Please inform pt. Of his CT scan  Large Kidney stone, 2 cm at left kidney side, causing mild hydronephrosis ,  Mild wall thickening at duodenum ( small intestine ) inflammation.  I referred him to see Urology  thanks

## 2019-11-01 NOTE — TELEPHONE ENCOUNTER
Attempt # 1 at [phone] (home)     Did patient answer the phone: No, left a message on voicemail to return call to triage line at [phone].    Aiyana McmanusRN,BSN  [facility]

## 2019-11-04 NOTE — TELEPHONE ENCOUNTER
Notified patient of the results below.    Did you want him seen at [facility] [place] [facility] [place] [phone] per referral or any urologist.    Patient would like a call back to then help set up appointment.    Aiyana McmanusRN,BSN  [facility]

## 2019-11-04 NOTE — TELEPHONE ENCOUNTER
Routed to TC to please call and help schedule patient, per patients request.    Aiyana Mcmanus RN,BSN  [facility]

## 2019-11-05 ENCOUNTER — PRE VISIT (OUTPATIENT)
Dept: UROLOGY | Facility: CLINIC | Age: 34
End: 2019-11-05

## 2019-11-05 NOTE — TELEPHONE ENCOUNTER
Reason for Visit: Consult, referral in system    Diagnosis: Nephrolithiasis, Kidney Stones    Orders/Procedures/Records: Records available    Contact Patient: N/A    Rooming Requirements: Briana Santos  11/05/19  7:04 AM

## 2019-11-06 ENCOUNTER — DOCUMENTATION ONLY (OUTPATIENT)
Dept: CARE COORDINATION | Facility: CLINIC | Age: 34
End: 2019-11-06

## 2019-11-12 ENCOUNTER — ALLIED HEALTH/NURSE VISIT (OUTPATIENT)
Dept: UROLOGY | Facility: CLINIC | Age: 34
End: 2019-11-12
Payer: COMMERCIAL

## 2019-11-12 ENCOUNTER — OFFICE VISIT (OUTPATIENT)
Dept: UROLOGY | Facility: CLINIC | Age: 34
End: 2019-11-12
Attending: FAMILY MEDICINE
Payer: COMMERCIAL

## 2019-11-12 VITALS — WEIGHT: 168 LBS | BODY MASS INDEX: 26.37 KG/M2 | HEIGHT: 67 IN

## 2019-11-12 DIAGNOSIS — N20.0 KIDNEY STONE: Primary | ICD-10-CM

## 2019-11-12 DIAGNOSIS — N20.0 KIDNEY STONE: ICD-10-CM

## 2019-11-12 LAB
ALBUMIN UR-MCNC: NEGATIVE MG/DL
APPEARANCE UR: CLEAR
BILIRUB UR QL STRIP: NEGATIVE
COLOR UR AUTO: YELLOW
ERYTHROCYTE [DISTWIDTH] IN BLOOD BY AUTOMATED COUNT: 12 % (ref 10–15)
GLUCOSE UR STRIP-MCNC: NEGATIVE MG/DL
HCT VFR BLD AUTO: 46.7 % (ref 40–53)
HGB BLD-MCNC: 16.7 G/DL (ref 13.3–17.7)
HGB UR QL STRIP: ABNORMAL
KETONES UR STRIP-MCNC: NEGATIVE MG/DL
LEUKOCYTE ESTERASE UR QL STRIP: ABNORMAL
MCH RBC QN AUTO: 32.2 PG (ref 26.5–33)
MCHC RBC AUTO-ENTMCNC: 35.8 G/DL (ref 31.5–36.5)
MCV RBC AUTO: 90 FL (ref 78–100)
MUCOUS THREADS #/AREA URNS LPF: PRESENT /LPF
NITRATE UR QL: NEGATIVE
PH UR STRIP: 6 PH (ref 5–7)
PLATELET # BLD AUTO: 167 10E9/L (ref 150–450)
RBC # BLD AUTO: 5.19 10E12/L (ref 4.4–5.9)
RBC #/AREA URNS AUTO: 1 /HPF (ref 0–2)
SOURCE: ABNORMAL
SP GR UR STRIP: 1.01 (ref 1–1.03)
SQUAMOUS #/AREA URNS AUTO: <1 /HPF (ref 0–1)
UROBILINOGEN UR STRIP-MCNC: 0 MG/DL (ref 0–2)
WBC # BLD AUTO: 6.7 10E9/L (ref 4–11)
WBC #/AREA URNS AUTO: 10 /HPF (ref 0–5)

## 2019-11-12 RX ORDER — DORZOLAMIDE HYDROCHLORIDE AND TIMOLOL MALEATE 20; 5 MG/ML; MG/ML
SOLUTION/ DROPS OPHTHALMIC
Refills: 2 | COMMUNITY
Start: 2019-09-10

## 2019-11-12 RX ORDER — PREDNISONE 20 MG/1
TABLET ORAL
Refills: 0 | COMMUNITY
Start: 2019-01-04 | End: 2020-02-17

## 2019-11-12 RX ORDER — NITROFURANTOIN 25; 75 MG/1; MG/1
100 CAPSULE ORAL 2 TIMES DAILY
Qty: 14 CAPSULE | Refills: 0 | Status: SHIPPED | OUTPATIENT
Start: 2019-11-12 | End: 2020-02-17

## 2019-11-12 ASSESSMENT — ENCOUNTER SYMPTOMS
MUSCLE CRAMPS: 0
LOSS OF CONSCIOUSNESS: 0
DEPRESSION: 0
MUSCLE WEAKNESS: 0
NECK PAIN: 0
CONSTIPATION: 0
RECTAL PAIN: 0
BOWEL INCONTINENCE: 0
JAUNDICE: 0
BLOATING: 0
DIZZINESS: 0
JOINT SWELLING: 0
HEARTBURN: 0
SPEECH CHANGE: 0
NAUSEA: 0
PARALYSIS: 0
DIARRHEA: 1
BLOOD IN STOOL: 0
INSOMNIA: 1
NERVOUS/ANXIOUS: 0
DECREASED CONCENTRATION: 0
TREMORS: 0
ABDOMINAL PAIN: 1
PANIC: 0
VOMITING: 1
MEMORY LOSS: 1
MYALGIAS: 0
ARTHRALGIAS: 0
BACK PAIN: 1
SEIZURES: 0
HEADACHES: 1
NUMBNESS: 0
WEAKNESS: 0
STIFFNESS: 0
TINGLING: 0
DISTURBANCES IN COORDINATION: 0

## 2019-11-12 ASSESSMENT — PAIN SCALES - GENERAL: PAINLEVEL: MODERATE PAIN (5)

## 2019-11-12 ASSESSMENT — MIFFLIN-ST. JEOR: SCORE: 1655.67

## 2019-11-12 NOTE — NURSING NOTE
"Chief Complaint   Patient presents with     Consult     Nephrolithiasis, Kidney Stones       Height 1.702 m (5' 7\"), weight 76.2 kg (168 lb). Body mass index is 26.31 kg/m .    Patient Active Problem List   Diagnosis     Primary open angle glaucoma of both eyes, moderate stage     Corneal opacity     Acute bilateral low back pain without sciatica     Acute pain of right shoulder     Neck pain       No Known Allergies    Current Outpatient Medications   Medication Sig Dispense Refill     polyethylene glycol (MIRALAX) powder Take 17 g (1 capful) by mouth daily 850 g 6     dorzolamide-timolol (COSOPT) 2-0.5 % ophthalmic solution PLACE 1 DROP INTO BOTH EYES 2 TIMES DAILY FOR 90 DAYS  2     latanoprost (XALATAN) 0.005 % ophthalmic solution Place 1 drop Into the left eye At Bedtime. (Patient not taking: Reported on 11/12/2019) 1 Bottle 9     lidocaine (XYLOCAINE) 2 % external gel Apply topically as needed for moderate pain Apply to area qid as needed (Patient not taking: Reported on 10/25/2019) 30 mL 3     nabumetone (RELAFEN) 750 MG tablet Take 1 tablet (750 mg) by mouth 2 times daily (Patient not taking: Reported on 10/25/2019) 60 tablet 1     predniSONE (DELTASONE) 20 MG tablet   0       Social History     Tobacco Use     Smoking status: Never Smoker     Smokeless tobacco: Never Used   Substance Use Topics     Alcohol use: Yes     Comment: Social     Drug use: No       LILIBETH Reinoso  11/12/2019  9:59 AM     "

## 2019-11-12 NOTE — PROGRESS NOTES
Urology Clinic    Diego Pitts MD  Date of Service: 2019     Name: Steven Rubio  MRN: [id number]  Age: 34 year old  : 1985  Referring provider: Jac Bell     Assessment and Plan:  Assessment:  Steven Rubio is a 34 year old male with a large left proximal ureteral stone diagnosed in the context of left flank/abdominal pain chronic for past year.     Plan:  We discussed the surgical treatment options for renal stones including shock wave lithotripsy, ureteroscopy, and percutaneous nephrolithotomy.  We discussed the risks and benefits of each approach in the context of the patient's current stone burden, including but not limited to the following: bleeding, infection, damage to adjacent organs, ureteral stricture, need for staged treatment, incomplete stone removal. After consideration of each the decision was made to proceed with left PCNL due to the size of the stone. We discussed that surgery will take about 2 hours and he should plan to stay in the hospital overnight.     We discussed the risks of percutaneous nephrolithotomy including but not limited to the following:  -Risk of infection  -Risk of acute bleeding and requirement of blood transfusion  -Risk of delayed bleeding and pseudoaneurysm formation requiring embolization and/or alternative ancillary procedures  -Risk of renal damage and subsequent renal insufficiency  -Risk of urinary tract injury or injury to adjacent organs including but not limited to lungs, spleen, liver, and GI tract  -Risk of incomplete stone removal and need for additional procedures  -Need for post-operative renal drainage in the form or nephrostomy tube, stent or both as well as associated symptoms  -Stent will be left in place for 2 weeks post-operatively to limit risk of ureteral stricture.       We will make arrangements for surgery on 2019 at the [facility].     Urine was sent for culture today.     CBC today.     Start  Macrobid 100 mg twice daily.     Discussed possibility of this being GI related pain but recommend stone removal either way, will continue PMD follow-up for duodenitis seen on CT, particularly if symptoms do not improve    ---------------------------------------------------------------------------------------------------------------------    Chief Complaint:   Kidney stones     HPI:   Steven Rubio is a 34 year old male with a 2 cm left proximal ureteral stone stone who presents for evaluation. He notes that he has had left lower quadrant abdominal/flank pain for about a year but was just informed that he had a kidney stone after recent CT in 10/2019. He tried a stool softener previously without improvement in pain. Notably, there is a CT report from 2012 (report only revieweD) that showed a 1.6 cm left proximal ureteral stone, he was unaware of this.   Denies UTI, hematuria, dysuria.      Stone History is as Follows:  First stone: current stone   Number of stone surgeries: 0  Number of stones passed spontaneously: 0  History of UTI: no   Prior Stone Analysis: n/a  Family History Nephrolithasis: no   Prior Metabolic Workup: n/a    He plans to travel out of the country at the end of December 2019.     Review of Systems:   Pertinent items are noted in HPI or as below, remainder of complete ROS is negative.      Active Medications:     Current Outpatient Medications:      polyethylene glycol (MIRALAX) powder, Take 17 g (1 capful) by mouth daily, Disp: 850 g, Rfl: 6     dorzolamide-timolol (COSOPT) 2-0.5 % ophthalmic solution, PLACE 1 DROP INTO BOTH EYES 2 TIMES DAILY FOR 90 DAYS, Disp: , Rfl: 2     latanoprost (XALATAN) 0.005 % ophthalmic solution, Place 1 drop Into the left eye At Bedtime. (Patient not taking: Reported on 11/12/2019), Disp: 1 Bottle, Rfl: 9     lidocaine (XYLOCAINE) 2 % external gel, Apply topically as needed for moderate pain Apply to area qid as needed (Patient not taking: Reported on  "10/25/2019), Disp: 30 mL, Rfl: 3     nabumetone (RELAFEN) 750 MG tablet, Take 1 tablet (750 mg) by mouth 2 times daily (Patient not taking: Reported on 10/25/2019), Disp: 60 tablet, Rfl: 1     predniSONE (DELTASONE) 20 MG tablet, , Disp: , Rfl: 0      Allergies:   Patient has no known allergies.      Past Medical History:  Open angle glaucoma   Corneal opacity   Bilateral low back pain   Right shoulder pain   Neck pain     Past Surgical History:  The patient does not have any pertinent past surgical history.    Family History:   No past pertinent family history.     Social History:   No tobacco use.   Social alcohol use.   He works as an .    Physical Exam:   Patient is a 34 year old  male   Vitals: Height 1.702 m (5' 7\"), weight 76.2 kg (168 lb).  General Appearance Adult: Alert, no acute distress, oriented  HENT: throat/mouth:normal, good dentition  Neck: No adenopathy,masses or thyromegaly  Lungs: no respiratory distress, or pursed lip breathing  Heart: No obvious jugular venous distension present  Abdomen: soft, nontender, no organomegaly or masses, Body mass index is 26.31 kg/m . No flank pain.   Lymphatics: No cervical or supraclavicular adenopathy  Musculoskeltal: extremities normal, no peripheral edema  Skin: no suspicious lesions or rashes  Neuro: Alert, oriented, speech and mentation normal  Psych: affect and mood normal  Gait: Normal  : deferred    Imaging:   I have personally reviewed the results of the below imaging studies. The results were discussed with the patient.     Results for orders placed or performed in visit on 10/31/19   CT Abdomen Pelvis w Contrast    Narrative    CT ABDOMEN/PELVIS WITH CONTRAST October 31, 2019 1:53 PM     HISTORY: Abdomen pain, acute, generalized. Left lower quadrant pain  for over one year.    TECHNIQUE: CT abdomen and pelvis with IV. Radiation dose for this scan  was reduced using automated exposure control, adjustment of the mA  and/or kV according to " patient size, or iterative reconstruction  technique.    COMPARISON: [facility] 10/25/2019.    FINDINGS: There is a large left ureteropelvic junction stone again  identified. It measures 2 x 0.9 cm coronal series 4 image 26. There is  mild left hydronephrosis and left extra renal pelvis. No additional  urolithiasis on either side. No right hydronephrosis. Small right  extrarenal pelvis. No other renal focal abnormality can be seen.    Liver, gallbladder, adrenals, spleen, and pancreas shows no acute  abnormalities. There is some mild wall prominence of the third and  fourth portion of the duodenum series 3 image 30. There is no bowel  obstruction identified. There is no free fluid or free air. Clear lung  bases. No adenopathy. No significant osseous abnormalities.      Impression    IMPRESSION:  1. Left ureteropelvic junction prominent stone with a length of 2 cm.  Mild left hydronephrosis.  2. Some wall thickening of the third and fourth portions of the  duodenum raising the possibility of duodenitis.    IRMA EDGAR MD     Laboratory:   I personally reviewed all applicable laboratory data and went over findings with patient  Significant for:    BMP RESULTS:  Recent Labs   Lab Test 10/25/19  0930      POTASSIUM 3.8   CHLORIDE 107   CO2 28   ANIONGAP 8   GLC 91   BUN 12   CR 1.18   GFRESTIMATED 79   GFRESTBLACK >90   LAURENCE 8.8     CALCIUM RESULTS  Lab Results   Component Value Date    LAURENCE 8.8 10/25/2019       Scribe Disclosure:  ILana, am serving as a scribe to document services personally performed by Diego Pitts MD at this visit, based upon the provider's statements to me. All documentation has been reviewed by the aforementioned provider prior to being entered into the official medical record.     Answers for HPI/ROS submitted by the patient on 11/12/2019   General Symptoms: No  Skin Symptoms: No  HENT Symptoms: No  EYE SYMPTOMS: No  HEART SYMPTOMS: No  LUNG SYMPTOMS: No  INTESTINAL SYMPTOMS:  Yes  URINARY SYMPTOMS: No  REPRODUCTIVE SYMPTOMS: No  SKELETAL SYMPTOMS: Yes  BLOOD SYMPTOMS: No  NERVOUS SYSTEM SYMPTOMS: Yes  MENTAL HEALTH SYMPTOMS: Yes  Heart burn or indigestion: No  Nausea: No  Vomiting: Yes  Abdominal pain: Yes  Bloating: No  Constipation: No  Diarrhea: Yes  Blood in stool: No  Black stools: No  Rectal or Anal pain: No  Fecal incontinence: No  Yellowing of skin or eyes: No  Vomit with blood: No  Change in stools: No  Back pain: Yes  Muscle aches: No  Neck pain: No  Swollen joints: No  Joint pain: No  Bone pain: No  Muscle cramps: No  Muscle weakness: No  Joint stiffness: No  Bone fracture: No  Trouble with coordination: No  Dizziness or trouble with balance: No  Fainting or black-out spells: No  Memory loss: Yes  Headache: Yes  Seizures: No  Speech problems: No  Tingling: No  Tremor: No  Weakness: No  Difficulty walking: No  Paralysis: No  Numbness: No  Nervous or Anxious: No  Depression: No  Trouble sleeping: Yes  Trouble thinking or concentrating: No  Mood changes: No  Panic attacks: No

## 2019-11-12 NOTE — PROGRESS NOTES
Dr. Pitts will be doing his surgery at [facility]. I gave him the contact information and faxed the face sheet to Arlette at [facility]. Angelica Pulido RN

## 2019-11-12 NOTE — LETTER
2019       RE: Steven Rubio  [address] [facility] [address]     Dear Colleague,    Thank you for referring your patient, Steven Rubio, to the [facility] UROLOGY AND INST FOR PROSTATE AND UROLOGIC CANCERS at [facility]. Please see a copy of my visit note below.      Urology Clinic    Diego Pitts MD  Date of Service: 2019     Name: Steven Rubio  MRN: [id number]  Age: 34 year old  : 1985  Referring provider: Jac Bell     Assessment and Plan:  Assessment:  Steven Rubio is a 34 year old male with a large left proximal ureteral stone diagnosed in the context of left flank/abdominal pain chronic for past year.     Plan:  We discussed the surgical treatment options for renal stones including shock wave lithotripsy, ureteroscopy, and percutaneous nephrolithotomy.  We discussed the risks and benefits of each approach in the context of the patient's current stone burden, including but not limited to the following: bleeding, infection, damage to adjacent organs, ureteral stricture, need for staged treatment, incomplete stone removal. After consideration of each the decision was made to proceed with left PCNL due to the size of the stone. We discussed that surgery will take about 2 hours and he should plan to stay in the hospital overnight.     We discussed the risks of percutaneous nephrolithotomy including but not limited to the following:  -Risk of infection  -Risk of acute bleeding and requirement of blood transfusion  -Risk of delayed bleeding and pseudoaneurysm formation requiring embolization and/or alternative ancillary procedures  -Risk of renal damage and subsequent renal insufficiency  -Risk of urinary tract injury or injury to adjacent organs including but not limited to lungs, spleen, liver, and GI tract  -Risk of incomplete stone removal and need for additional procedures  -Need for post-operative renal drainage in the  form or nephrostomy tube, stent or both as well as associated symptoms  -Stent will be left in place for 2 weeks post-operatively to limit risk of ureteral stricture.       We will make arrangements for surgery on 11/18/2019 at the [facility].     Urine was sent for culture today.     CBC today.     Start Macrobid 100 mg twice daily.     Discussed possibility of this being GI related pain but recommend stone removal either way, will continue PMD follow-up for duodenitis seen on CT, particularly if symptoms do not improve    ---------------------------------------------------------------------------------------------------------------------    Chief Complaint:   Kidney stones     HPI:   Steven Rubio is a 34 year old male with a 2 cm left proximal ureteral stone stone who presents for evaluation. He notes that he has had left lower quadrant abdominal/flank pain for about a year but was just informed that he had a kidney stone after recent CT in 10/2019. He tried a stool softener previously without improvement in pain. Notably, there is a CT report from 2012 (report only revieweD) that showed a 1.6 cm left proximal ureteral stone, he was unaware of this.   Denies UTI, hematuria, dysuria.      Stone History is as Follows:  First stone: current stone   Number of stone surgeries: 0  Number of stones passed spontaneously: 0  History of UTI: no   Prior Stone Analysis: n/a  Family History Nephrolithasis: no   Prior Metabolic Workup: n/a    He plans to travel out of the country at the end of December 2019.     Review of Systems:   Pertinent items are noted in HPI or as below, remainder of complete ROS is negative.      Active Medications:     Current Outpatient Medications:      polyethylene glycol (MIRALAX) powder, Take 17 g (1 capful) by mouth daily, Disp: 850 g, Rfl: 6     dorzolamide-timolol (COSOPT) 2-0.5 % ophthalmic solution, PLACE 1 DROP INTO BOTH EYES 2 TIMES DAILY FOR 90 DAYS, Disp: , Rfl: 2      "latanoprost (XALATAN) 0.005 % ophthalmic solution, Place 1 drop Into the left eye At Bedtime. (Patient not taking: Reported on 11/12/2019), Disp: 1 Bottle, Rfl: 9     lidocaine (XYLOCAINE) 2 % external gel, Apply topically as needed for moderate pain Apply to area qid as needed (Patient not taking: Reported on 10/25/2019), Disp: 30 mL, Rfl: 3     nabumetone (RELAFEN) 750 MG tablet, Take 1 tablet (750 mg) by mouth 2 times daily (Patient not taking: Reported on 10/25/2019), Disp: 60 tablet, Rfl: 1     predniSONE (DELTASONE) 20 MG tablet, , Disp: , Rfl: 0      Allergies:   Patient has no known allergies.      Past Medical History:  Open angle glaucoma   Corneal opacity   Bilateral low back pain   Right shoulder pain   Neck pain     Past Surgical History:  The patient does not have any pertinent past surgical history.    Family History:   No past pertinent family history.     Social History:   No tobacco use.   Social alcohol use.   He works as an .    Physical Exam:   Patient is a 34 year old  male   Vitals: Height 1.702 m (5' 7\"), weight 76.2 kg (168 lb).  General Appearance Adult: Alert, no acute distress, oriented  HENT: throat/mouth:normal, good dentition  Neck: No adenopathy,masses or thyromegaly  Lungs: no respiratory distress, or pursed lip breathing  Heart: No obvious jugular venous distension present  Abdomen: soft, nontender, no organomegaly or masses, Body mass index is 26.31 kg/m . No flank pain.   Lymphatics: No cervical or supraclavicular adenopathy  Musculoskeltal: extremities normal, no peripheral edema  Skin: no suspicious lesions or rashes  Neuro: Alert, oriented, speech and mentation normal  Psych: affect and mood normal  Gait: Normal  : deferred    Imaging:   I have personally reviewed the results of the below imaging studies. The results were discussed with the patient.     Results for orders placed or performed in visit on 10/31/19   CT Abdomen Pelvis w Contrast    Narrative    CT " ABDOMEN/PELVIS WITH CONTRAST October 31, 2019 1:53 PM     HISTORY: Abdomen pain, acute, generalized. Left lower quadrant pain  for over one year.    TECHNIQUE: CT abdomen and pelvis with IV. Radiation dose for this scan  was reduced using automated exposure control, adjustment of the mA  and/or kV according to patient size, or iterative reconstruction  technique.    COMPARISON: KUB 10/25/2019.    FINDINGS: There is a large left ureteropelvic junction stone again  identified. It measures 2 x 0.9 cm coronal series 4 image 26. There is  mild left hydronephrosis and left extra renal pelvis. No additional  urolithiasis on either side. No right hydronephrosis. Small right  extrarenal pelvis. No other renal focal abnormality can be seen.    Liver, gallbladder, adrenals, spleen, and pancreas shows no acute  abnormalities. There is some mild wall prominence of the third and  fourth portion of the duodenum series 3 image 30. There is no bowel  obstruction identified. There is no free fluid or free air. Clear lung  bases. No adenopathy. No significant osseous abnormalities.      Impression    IMPRESSION:  1. Left ureteropelvic junction prominent stone with a length of 2 cm.  Mild left hydronephrosis.  2. Some wall thickening of the third and fourth portions of the  duodenum raising the possibility of duodenitis.    IRMA EDGAR MD     Laboratory:   I personally reviewed all applicable laboratory data and went over findings with patient  Significant for:    BMP RESULTS:  Recent Labs   Lab Test 10/25/19  0930      POTASSIUM 3.8   CHLORIDE 107   CO2 28   ANIONGAP 8   GLC 91   BUN 12   CR 1.18   GFRESTIMATED 79   GFRESTBLACK >90   LAURENCE 8.8     CALCIUM RESULTS  Lab Results   Component Value Date    LAURENCE 8.8 10/25/2019       Scribe Disclosure:  ILana, am serving as a scribe to document services personally performed by Diego Pitts MD at this visit, based upon the provider's statements to me. All  documentation has been reviewed by the aforementioned provider prior to being entered into the official medical record.     Answers for HPI/ROS submitted by the patient on 11/12/2019   General Symptoms: No  Skin Symptoms: No  HENT Symptoms: No  EYE SYMPTOMS: No  HEART SYMPTOMS: No  LUNG SYMPTOMS: No  INTESTINAL SYMPTOMS: Yes  URINARY SYMPTOMS: No  REPRODUCTIVE SYMPTOMS: No  SKELETAL SYMPTOMS: Yes  BLOOD SYMPTOMS: No  NERVOUS SYSTEM SYMPTOMS: Yes  MENTAL HEALTH SYMPTOMS: Yes  Heart burn or indigestion: No  Nausea: No  Vomiting: Yes  Abdominal pain: Yes  Bloating: No  Constipation: No  Diarrhea: Yes  Blood in stool: No  Black stools: No  Rectal or Anal pain: No  Fecal incontinence: No  Yellowing of skin or eyes: No  Vomit with blood: No  Change in stools: No  Back pain: Yes  Muscle aches: No  Neck pain: No  Swollen joints: No  Joint pain: No  Bone pain: No  Muscle cramps: No  Muscle weakness: No  Joint stiffness: No  Bone fracture: No  Trouble with coordination: No  Dizziness or trouble with balance: No  Fainting or black-out spells: No  Memory loss: Yes  Headache: Yes  Seizures: No  Speech problems: No  Tingling: No  Tremor: No  Weakness: No  Difficulty walking: No  Paralysis: No  Numbness: No  Nervous or Anxious: No  Depression: No  Trouble sleeping: Yes  Trouble thinking or concentrating: No  Mood changes: No  Panic attacks: No            Again, thank you for allowing me to participate in the care of your patient.      Sincerely,    Diego Pitts MD

## 2019-11-13 ENCOUNTER — AMBULATORY - HEALTHEAST (OUTPATIENT)
Dept: UROLOGY | Facility: CLINIC | Age: 34
End: 2019-11-13

## 2019-11-13 DIAGNOSIS — Z87.442 HISTORY OF KIDNEY STONES: ICD-10-CM

## 2019-11-13 DIAGNOSIS — N20.0 KIDNEY STONE: ICD-10-CM

## 2019-11-13 LAB
BACTERIA SPEC CULT: NO GROWTH
Lab: NORMAL
SPECIMEN SOURCE: NORMAL

## 2019-11-14 ASSESSMENT — MIFFLIN-ST. JEOR: SCORE: 1645.67

## 2019-11-15 ENCOUNTER — RECORDS - HEALTHEAST (OUTPATIENT)
Dept: ADMINISTRATIVE | Facility: OTHER | Age: 34
End: 2019-11-15

## 2019-11-18 ENCOUNTER — HOSPITAL ENCOUNTER (OUTPATIENT)
Dept: INTERVENTIONAL RADIOLOGY/VASCULAR | Facility: CLINIC | Age: 34
Discharge: HOME | End: 2019-11-18
Attending: UROLOGY

## 2019-11-18 ENCOUNTER — ANESTHESIA - HEALTHEAST (OUTPATIENT)
Dept: SURGERY | Facility: CLINIC | Age: 34
End: 2019-11-18

## 2019-11-18 ENCOUNTER — SURGERY - HEALTHEAST (OUTPATIENT)
Dept: SURGERY | Facility: CLINIC | Age: 34
End: 2019-11-18

## 2019-11-18 DIAGNOSIS — N20.0 CALCULUS OF LEFT KIDNEY: ICD-10-CM

## 2019-11-18 ASSESSMENT — MIFFLIN-ST. JEOR: SCORE: 1625.26

## 2019-11-19 ASSESSMENT — MIFFLIN-ST. JEOR: SCORE: 1622.09

## 2019-11-27 ENCOUNTER — AMBULATORY - HEALTHEAST (OUTPATIENT)
Dept: UROLOGY | Facility: CLINIC | Age: 34
End: 2019-11-27

## 2019-11-27 DIAGNOSIS — N20.1 CALCULUS OF URETER: ICD-10-CM

## 2019-11-27 DIAGNOSIS — Z87.442 HISTORY OF KIDNEY STONES: ICD-10-CM

## 2019-11-27 DIAGNOSIS — N20.0 KIDNEY STONE: ICD-10-CM

## 2019-11-27 LAB
ALBUMIN UR-MCNC: ABNORMAL MG/DL
APPEARANCE UR: ABNORMAL
BILIRUB UR QL STRIP: NEGATIVE
COLOR UR AUTO: YELLOW
GLUCOSE UR STRIP-MCNC: NEGATIVE MG/DL
HGB UR QL STRIP: ABNORMAL
KETONES UR STRIP-MCNC: NEGATIVE MG/DL
LEUKOCYTE ESTERASE UR QL STRIP: ABNORMAL
NITRATE UR QL: NEGATIVE
PH UR STRIP: 7 [PH] (ref 5–8)
SP GR UR STRIP: 1.02 (ref 1–1.03)
UROBILINOGEN UR STRIP-ACNC: ABNORMAL

## 2019-11-28 LAB — BACTERIA SPEC CULT: NO GROWTH

## 2019-12-18 ENCOUNTER — RECORDS - HEALTHEAST (OUTPATIENT)
Dept: RADIOLOGY | Facility: CLINIC | Age: 34
End: 2019-12-18

## 2019-12-18 ENCOUNTER — OFFICE VISIT - HEALTHEAST (OUTPATIENT)
Dept: UROLOGY | Facility: CLINIC | Age: 34
End: 2019-12-18

## 2019-12-18 ENCOUNTER — HOSPITAL ENCOUNTER (OUTPATIENT)
Dept: CT IMAGING | Facility: CLINIC | Age: 34
Discharge: HOME | End: 2019-12-18
Attending: UROLOGY

## 2019-12-18 DIAGNOSIS — R10.9 LEFT FLANK PAIN: ICD-10-CM

## 2019-12-18 DIAGNOSIS — N20.1 CALCULUS OF URETER: ICD-10-CM

## 2019-12-18 LAB
ALBUMIN UR-MCNC: NEGATIVE MG/DL
APPEARANCE UR: CLEAR
BILIRUB UR QL STRIP: NEGATIVE
COLOR UR AUTO: YELLOW
GLUCOSE UR STRIP-MCNC: NEGATIVE MG/DL
HGB UR QL STRIP: NEGATIVE
KETONES UR STRIP-MCNC: NEGATIVE MG/DL
LEUKOCYTE ESTERASE UR QL STRIP: NEGATIVE
NITRATE UR QL: NEGATIVE
PH UR STRIP: 7 [PH] (ref 5–8)
SP GR UR STRIP: 1.01 (ref 1–1.03)
UROBILINOGEN UR STRIP-ACNC: NORMAL

## 2020-02-17 ENCOUNTER — OFFICE VISIT (OUTPATIENT)
Dept: FAMILY MEDICINE | Facility: CLINIC | Age: 35
End: 2020-02-17
Payer: COMMERCIAL

## 2020-02-17 VITALS
HEIGHT: 67 IN | BODY MASS INDEX: 27.62 KG/M2 | DIASTOLIC BLOOD PRESSURE: 69 MMHG | HEART RATE: 81 BPM | SYSTOLIC BLOOD PRESSURE: 106 MMHG | WEIGHT: 176 LBS | TEMPERATURE: 97.8 F | OXYGEN SATURATION: 99 %

## 2020-02-17 DIAGNOSIS — K21.9 GASTROESOPHAGEAL REFLUX DISEASE WITHOUT ESOPHAGITIS: Primary | ICD-10-CM

## 2020-02-17 PROCEDURE — 99213 OFFICE O/P EST LOW 20 MIN: CPT | Performed by: FAMILY MEDICINE

## 2020-02-17 ASSESSMENT — PAIN SCALES - GENERAL: PAINLEVEL: MODERATE PAIN (4)

## 2020-02-17 ASSESSMENT — MIFFLIN-ST. JEOR: SCORE: 1687.99

## 2020-02-17 NOTE — PROGRESS NOTES
"Diane Rubio is a 34 year old male who presents to clinic today for the following health issues:    HPI \"  Patient reports for the past month or so he has been feeling heavy feeling in his chest after he eats.  He reports no other symptoms.  He reports since he came back from his native country, [place] a month ago he started having these feelings.  He eats fine.  Does not choke, or cough when he eats.  Food goes down well.  He reports after he done eat he started feeling heaviness in his chest.  Otherwise, no cough, no chest pain or pressure.   no pain, does not wake up with any heavy feeling.     Patient does not smoke.  He drinks alcohol once in a while.  Denies drinking acidic food, he does eat spicy food sometimes.  And acidic food.  Does not drink coffee or tea.  Does not drink soda or energy drink.  He is not taking any over-the-counter medication.    He reports he tried antiacid medication once daily for a few days did not seems to help.    He denies constipation, denies change in his bowel habit no blood in the stool.  No change in color.  Has no weight loss.  No fever no chills.  Concern - Indigestion  Onset: three weeks    Description:   Food is not going on into stomach after eating, feels like something is sitting on chest    Intensity: moderate    Progression of Symptoms:  worsening    Accompanying Signs & Symptoms:  Nausea, chest pain    Previous history of similar problem:   no    Precipitating factors:   Worsened by: eating    Alleviating factors:  Improved by: none    Therapies Tried and outcome: antacid, did not work       Patient Active Problem List   Diagnosis     Primary open angle glaucoma of both eyes, moderate stage     Corneal opacity     Acute bilateral low back pain without sciatica     Acute pain of right shoulder     Neck pain     Past Surgical History:   Procedure Laterality Date     CYSTOSCOPY Left 11/18/2019       Social History     Tobacco Use     Smoking status: " Never Smoker     Smokeless tobacco: Never Used   Substance Use Topics     Alcohol use: Yes     Comment: Social     Family History   Problem Relation Age of Onset     Glaucoma No family hx of      Macular Degeneration No family hx of          Current Outpatient Medications   Medication Sig Dispense Refill     omeprazole (PRILOSEC) 20 MG DR capsule One tab bid for 2 weeks, then one tab daily as needed. 90 capsule 1     dorzolamide-timolol (COSOPT) 2-0.5 % ophthalmic solution PLACE 1 DROP INTO BOTH EYES 2 TIMES DAILY FOR 90 DAYS  2     latanoprost (XALATAN) 0.005 % ophthalmic solution Place 1 drop Into the left eye At Bedtime. (Patient not taking: Reported on 11/12/2019) 1 Bottle 9     lidocaine (XYLOCAINE) 2 % external gel Apply topically as needed for moderate pain Apply to area qid as needed (Patient not taking: Reported on 10/25/2019) 30 mL 3     polyethylene glycol (MIRALAX) powder Take 17 g (1 capful) by mouth daily (Patient not taking: Reported on 2/17/2020) 850 g 6     No Known Allergies    Reviewed and updated as needed this visit by Provider         Review of Systems   ROS COMP: Constitutional, HEENT, cardiovascular, pulmonary, gi and gu systems are negative, except as otherwise noted.      Objective    There were no vitals taken for this visit.  There is no height or weight on file to calculate BMI.  Physical Exam   GENERAL: healthy, alert and no distress  NECK: no adenopathy, no asymmetry, masses, or scars and thyroid normal to palpation  RESP: lungs clear to auscultation - no rales, rhonchi or wheezes  CV: regular rate and rhythm, normal S1 S2, no S3 or S4, no murmur, click or rub, no peripheral edema and peripheral pulses strong  ABDOMEN: soft, nontender, no hepatosplenomegaly, no masses and bowel sounds normal  MS: no gross musculoskeletal defects noted, no edema    Diagnostic Test Results:  Labs reviewed in Epic  none         Assessment & Plan       ICD-10-CM    1. Gastroesophageal reflux disease  without esophagitis K21.9 omeprazole (PRILOSEC) 20 MG DR capsule   Discussed with patient could be heartburn, discussed with him diet modification, lifestyle changes.  He was started on Prilosec twice daily for 2 weeks.    Discussed with patient symptoms worsening sees no improvement to follow-up       Patient Instructions       Patient Education     Lifestyle Changes for Controlling GERD  When you have GERD, stomach acid feels as if it s backing up toward your mouth. Whether or not you take medicine to control your GERD, your symptoms can often be improved with lifestyle changes. Talk to your healthcare provider about the following suggestions. These suggestions may help you get relief from your symptoms.      Raise your head  Reflux is more likely to strike when you re lying down flat, because stomach fluid can flow backward more easily. Raising the head of your bed 4 to 6 inches can help. To do this:    Slide blocks or books under the legs at the head of your bed. Or, place a wedge under the mattress. Many foam stores can make a suitable wedge for you. The wedge should run from your waist to the top of your head.    Don t just prop your head on several pillows. This increases pressure on your stomach. It can make GERD worse.  Watch your eating habits  Certain foods may increase the acid in your stomach or relax the lower esophageal sphincter. This makes GERD more likely. It s best to avoid the following if they cause you symptoms:    Coffee, tea, and carbonated drinks (with and without caffeine)    Fatty, fried, or spicy food    Mint, chocolate, onions, and tomatoes    Peppermint    Any other foods that seem to irritate your stomach or cause you pain  Relieve the pressure  Tips include the following:    Eat smaller meals, even if you have to eat more often.    Don t lie down right after you eat. Wait a few hours for your stomach to empty.    Avoid tight belts and tight-fitting clothes.    Lose excess  weight.  Tobacco and alcohol  Avoid smoking tobacco and drinking alcohol. They can make GERD symptoms worse.  Date Last Reviewed: 7/1/2016 2000-2019 [employer]. [address] [facility], [place] [address]. All rights reserved. This information is not intended as a substitute for professional medical care. Always follow your healthcare professional's instructions.           Patient Education     What Is GERD?     With GERD, the weak LES allows food and fluids to travel back, or reflux, into the esophagus.      If you often have a painful burning feeling in your chest after you eat, you may have gastroesophageal reflux disease (GERD). Heartburn that keeps coming back is a classic symptom of GERD. But you may have other symptoms as well. A GERD diagnosis is made only after a complete evaluation by your healthcare provider.  Note: Chest pain may also be caused by heart problems. Be sure to have all chest pain evaluated by a healthcare provider.   When you have a reflux problem  After you eat, food travels from your mouth down the esophagus to your stomach. Along the way, food passes through a one-way valve called the lower esophageal sphincter (LES). The LES sits at the opening to your stomach. Normally the LES opens when you swallow. It lets food enter the stomach, then closes quickly. With GERD, the LES doesn t work normally. It lets food and stomach acid flow back (reflux) into the esophagus.  Some common symptoms    Frequent heartburn or burping    Sour-tasting fluid backing up into your mouth    Symptoms that get worse after you eat, bend over, or lie down    Trouble swallowing or pain when swallowing    A dry, long-term (chronic) cough    Upset stomach (nausea) or vomiting  Relieving your discomfort  You and your healthcare provider can work together to find the treatment options that best ease your symptoms. These may include lifestyle changes, medicine, and possibly surgery.  Many people find  their GERD symptoms decrease when they eat small frequent meals instead of 3 large ones. Reducing the amount of fatty foods in your diet will also help.   The following foods tend to cause problems for people diagnosed with GERD:    Tomatoes and tomato products    Alcohol    Coffee    Peppermint    Greasy or spicy foods  Talk with your provider if you don t understand how to make the dietary changes needed to control your GERD symptoms. Your provider can refer you to a nutritionist.  Date Last Reviewed: 7/1/2016 2000-2019 [employer]. [address] [place] [address], [place] [address]. All rights reserved. This information is not intended as a substitute for professional medical care. Always follow your healthcare professional's instructions.               Return in about 3 months (around 5/17/2020) for Physical Exam.    Jac Bell MD  [facility]

## 2020-02-17 NOTE — PATIENT INSTRUCTIONS
Patient Education     Lifestyle Changes for Controlling GERD  When you have GERD, stomach acid feels as if it s backing up toward your mouth. Whether or not you take medicine to control your GERD, your symptoms can often be improved with lifestyle changes. Talk to your healthcare provider about the following suggestions. These suggestions may help you get relief from your symptoms.      Raise your head  Reflux is more likely to strike when you re lying down flat, because stomach fluid can flow backward more easily. Raising the head of your bed 4 to 6 inches can help. To do this:    Slide blocks or books under the legs at the head of your bed. Or, place a wedge under the mattress. Many [employer] can make a suitable wedge for you. The wedge should run from your waist to the top of your head.    Don t just prop your head on several pillows. This increases pressure on your stomach. It can make GERD worse.  Watch your eating habits  Certain foods may increase the acid in your stomach or relax the lower esophageal sphincter. This makes GERD more likely. It s best to avoid the following if they cause you symptoms:    Coffee, tea, and carbonated drinks (with and without caffeine)    Fatty, fried, or spicy food    Mint, chocolate, onions, and tomatoes    Peppermint    Any other foods that seem to irritate your stomach or cause you pain  Relieve the pressure  Tips include the following:    Eat smaller meals, even if you have to eat more often.    Don t lie down right after you eat. Wait a few hours for your stomach to empty.    Avoid tight belts and tight-fitting clothes.    Lose excess weight.  Tobacco and alcohol  Avoid smoking tobacco and drinking alcohol. They can make GERD symptoms worse.  Date Last Reviewed: 7/1/2016 2000-2019 [employer]. [address] [place] [address]. All rights reserved. This information is not intended as a substitute for professional medical care. Always follow your  healthcare professional's instructions.           Patient Education     What Is GERD?     With GERD, the weak LES allows food and fluids to travel back, or reflux, into the esophagus.      If you often have a painful burning feeling in your chest after you eat, you may have gastroesophageal reflux disease (GERD). Heartburn that keeps coming back is a classic symptom of GERD. But you may have other symptoms as well. A GERD diagnosis is made only after a complete evaluation by your healthcare provider.  Note: Chest pain may also be caused by heart problems. Be sure to have all chest pain evaluated by a healthcare provider.   When you have a reflux problem  After you eat, food travels from your mouth down the esophagus to your stomach. Along the way, food passes through a one-way valve called the lower esophageal sphincter (LES). The LES sits at the opening to your stomach. Normally the LES opens when you swallow. It lets food enter the stomach, then closes quickly. With GERD, the LES doesn t work normally. It lets food and stomach acid flow back (reflux) into the esophagus.  Some common symptoms    Frequent heartburn or burping    Sour-tasting fluid backing up into your mouth    Symptoms that get worse after you eat, bend over, or lie down    Trouble swallowing or pain when swallowing    A dry, long-term (chronic) cough    Upset stomach (nausea) or vomiting  Relieving your discomfort  You and your healthcare provider can work together to find the treatment options that best ease your symptoms. These may include lifestyle changes, medicine, and possibly surgery.  Many people find their GERD symptoms decrease when they eat small frequent meals instead of 3 large ones. Reducing the amount of fatty foods in your diet will also help.   The following foods tend to cause problems for people diagnosed with GERD:    Tomatoes and tomato products    Alcohol    Coffee    Peppermint    Greasy or spicy foods  Talk with your  provider if you don t understand how to make the dietary changes needed to control your GERD symptoms. Your provider can refer you to a nutritionist.  Date Last Reviewed: 7/1/2016 2000-2019 [employer]. [address] [place] [address], [place] [address]. All rights reserved. This information is not intended as a substitute for professional medical care. Always follow your healthcare professional's instructions.

## 2021-04-23 ENCOUNTER — TELEPHONE (OUTPATIENT)
Dept: UROLOGY | Facility: CLINIC | Age: 36
End: 2021-04-23

## 2021-04-23 NOTE — TELEPHONE ENCOUNTER
Health Call Center    Phone Message    May a detailed message be left on voicemail: yes     Reason for Call: Other: Pt calling to speak with Dr. Pitts about Kidney Stone's and questions. He last saw him in 2019. Refused to speak with a nurse only wants to speak with Dr. Pitts. Please call pt at [phone]. Thank you.     Action Taken: Message routed to:  [facility] uro    Travel Screening: Not Applicable

## 2021-04-23 NOTE — TELEPHONE ENCOUNTER
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 But he wants to know what he should be doing 2 years out of the surgery you performed on him for stones Ana Maria Burns, HA Staff Nurse

## 2021-04-26 DIAGNOSIS — N20.0 KIDNEY STONE: Primary | ICD-10-CM

## 2021-04-26 NOTE — TELEPHONE ENCOUNTER
Called patient and told him to schedule his renal us and kub and appt made for aug 24th at 10am first available video Ana Maria Burns LPN Staff Nurse

## 2021-05-01 ENCOUNTER — ANCILLARY PROCEDURE (OUTPATIENT)
Dept: GENERAL RADIOLOGY | Facility: CLINIC | Age: 36
End: 2021-05-01
Attending: UROLOGY
Payer: COMMERCIAL

## 2021-05-01 ENCOUNTER — ANCILLARY PROCEDURE (OUTPATIENT)
Dept: ULTRASOUND IMAGING | Facility: CLINIC | Age: 36
End: 2021-05-01
Attending: UROLOGY
Payer: COMMERCIAL

## 2021-05-01 DIAGNOSIS — N20.0 KIDNEY STONE: ICD-10-CM

## 2021-05-01 PROCEDURE — 74019 RADEX ABDOMEN 2 VIEWS: CPT | Performed by: RADIOLOGY

## 2021-05-01 PROCEDURE — 76770 US EXAM ABDO BACK WALL COMP: CPT | Performed by: RADIOLOGY

## 2021-05-12 ENCOUNTER — TELEPHONE (OUTPATIENT)
Dept: UROLOGY | Facility: CLINIC | Age: 36
End: 2021-05-12

## 2021-05-12 NOTE — TELEPHONE ENCOUNTER
[facility]    Phone Message    May a detailed message be left on voicemail: yes     Reason for Call: Requesting Results   Name/type of test: CT and Xray  Date of test: 5/1/2021  Was test done at a location other than [facility] (Please fill in the location if not [facility])?: No      Action Taken: Message routed to:  [facility] uro    Travel Screening: Not Applicable

## 2021-05-12 NOTE — TELEPHONE ENCOUNTER
I called patient and gave him results  Sent message to Hong to look at results and let me know if I should inform patient of anything else and sent to nurse to print and mail to patient Ana Maria Burns, HA Staff Nurse

## 2021-05-17 ENCOUNTER — TELEPHONE (OUTPATIENT)
Dept: UROLOGY | Facility: CLINIC | Age: 36
End: 2021-05-17

## 2021-05-17 NOTE — TELEPHONE ENCOUNTER
----- Message from Diego Pitts MD sent at 5/13/2021  8:07 AM CDT -----  YEs normal, no stones, I think that was the reason for the visit  ----- Message -----  From: Ana Maria Burns LPN  Sent: 5/12/2021   2:14 PM CDT  To: Diego Pitts MD    Patient had kub and renal ultrasounds normal but can you look at them and tell me anything else I should tell him  he cannot see you till aug but this is a 6 month follow up Ana Maria Burns LPN Staff Nurse

## 2021-05-26 VITALS — SYSTOLIC BLOOD PRESSURE: 138 MMHG | HEART RATE: 75 BPM | DIASTOLIC BLOOD PRESSURE: 75 MMHG | TEMPERATURE: 97.7 F

## 2021-05-26 VITALS — DIASTOLIC BLOOD PRESSURE: 74 MMHG | SYSTOLIC BLOOD PRESSURE: 119 MMHG | HEART RATE: 84 BPM | TEMPERATURE: 98.1 F

## 2021-06-03 NOTE — ANESTHESIA CARE TRANSFER NOTE
Last vitals:   Vitals:    11/18/19 1531   BP: 135/77   Pulse: 80   Resp: 12   Temp: 36.4  C (97.5  F)   SpO2: 100%     Patient's level of consciousness is drowsy  Spontaneous respirations: yes  Maintains airway independently: yes  Dentition unchanged: yes  Oropharynx: oropharynx clear of all foreign objects    QCDR Measures:  ASA# 20 - Surgical Safety Checklist: WHO surgical safety checklist completed prior to induction    PQRS# 430 - Adult PONV Prevention: 4558F - Pt received => 2 anti-emetic agents (different classes) preop & intraop  ASA# 8 - Peds PONV Prevention: NA - Not pediatric patient, not GA or 2 or more risk factors NOT present  PQRS# 424 - Lisa-op Temp Management: 4559F - At least one body temp DOCUMENTED => 35.5C or 95.9F within required timeframe  PQRS# 426 - PACU Transfer Protocol: - Transfer of care checklist used  ASA# 14 - Acute Post-op Pain: ASA14B - Patient did NOT experience pain >= 7 out of 10

## 2021-06-03 NOTE — ANESTHESIA POSTPROCEDURE EVALUATION
Patient: Steven Rubio  CYSTOSCOPY/LEFT PERCUTANEOUS NEPHROLITHOTOMY  Anesthesia type: general    Patient location: PACU  Last vitals:   Vitals Value Taken Time   /65 11/18/2019  6:00 PM   Temp 36.4  C (97.6  F) 11/18/2019  5:45 PM   Pulse 77 11/18/2019  6:09 PM   Resp 17 11/18/2019  6:09 PM   SpO2 100 % 11/18/2019  6:09 PM   Vitals shown include unvalidated device data.  Post vital signs: stable  Level of consciousness: awake and responds to simple questions  Post-anesthesia pain: pain controlled  Post-anesthesia nausea and vomiting: no  Pulmonary: unassisted, return to baseline  Cardiovascular: stable and blood pressure at baseline  Hydration: adequate  Anesthetic events: no    QCDR Measures:  ASA# 11 - Lisa-op Cardiac Arrest: ASA11B - Patient did NOT experience unanticipated cardiac arrest  ASA# 12 - Lisa-op Mortality Rate: ASA12B - Patient did NOT die  ASA# 13 - PACU Re-Intubation Rate: ASA13B - Patient did NOT require a new airway mgmt  ASA# 10 - Composite Anes Safety: ASA10A - No serious adverse event    Additional Notes:

## 2021-06-03 NOTE — PROGRESS NOTES
Assessment/Plan:        Diagnoses and all orders for this visit:    Kidney stone  -     Urinalysis Macroscopic  -     Culture, Urine- Future; Future; Expected date: 12/27/2019  -     Culture, Urine- Future  -     lidocaine HCl 2 % topical jelly 10 mL (UROJET)  -     ciprofloxacin HCl tablet 250 mg (CIPRO)    History of kidney stones  -     lidocaine HCl 2 % topical jelly 10 mL (UROJET)  -     ciprofloxacin HCl tablet 250 mg (CIPRO)    Calculus of ureter  -     Cystoscopy with Stent Removal Education  -     CT Abdomen Pelvis Without Oral Without IV Contrast; Future; Expected date: 12/27/2019  -     Patient Stated Goal: Prevent further stones    Other orders  -     IBUPROFEN ORAL; Take by mouth.  -     ciprofloxacin HCl (CIPRO) 250 MG tablet  -     lidocaine HCl (UROJET) 2 % topical jelly      Stone Management Plan  No flowsheet data found.            Subjective:      HPI  Mr. Steven Rubio is a 34 y.o.  male returning to the [facility] for early postoperative follow up for anticipated stent removal.     He returns status post right PCNL for proximal ureteral stone. He has had no unanticipated events.    He has had no symptoms suspicious for infection and stent was mildly symptomatic with typical issues of flank discomfort and lower urinary tract irritation.     Flexible cystoscopy is performed and indwelling stent is removed without incident.    He will follow up in the office in one month with imaging.       ROS   A 12 point comprehensive review of systems is negative except for HPI.    Past Medical History:   Diagnosis Date     Abdominal pain      Back pain      Constipation      Corneal opacity acute low back pain     Glaucoma      Neck pain      Shoulder pain, right        Past Surgical History:   Procedure Laterality Date     IR NEPHROLITHOTOMY  11/18/2019     NEPHROLITHOTOMY Left 11/18/2019    Procedure: CYSTOSCOPY/LEFT PERCUTANEOUS NEPHROLITHOTOMY;  Surgeon:  Diego Pitts MD;  Location: [facility] OR;  Service: Urology       Current Outpatient Medications   Medication Sig Dispense Refill     acetaminophen (TYLENOL) 500 MG tablet Take 2 tablets (1,000 mg total) by mouth every 6 (six) hours as needed for pain.  0     dimenhyDRINATE (DRAMAMINE) 50 MG tablet Take 1 tablet (50 mg total) by mouth at bedtime as needed.  0     IBUPROFEN ORAL Take by mouth.       latanoprost (XALATAN) 0.005 % ophthalmic solution Administer 1 drop into the left eye at bedtime.       nabumetone (RELAFEN) 750 MG tablet Take 750 mg by mouth 2 (two) times a day.       Current Facility-Administered Medications   Medication Dose Route Frequency Provider Last Rate Last Dose     ciprofloxacin HCl (CIPRO) 250 MG tablet              lidocaine HCl (UROJET) 2 % topical jelly                No Known Allergies    Social History     Socioeconomic History     Marital status: Single     Spouse name: Not on file     Number of children: Not on file     Years of education: Not on file     Highest education level: Not on file   Occupational History     Not on file   Social Needs     Financial resource strain: Not on file     Food insecurity:     Worry: Not on file     Inability: Not on file     Transportation needs:     Medical: Not on file     Non-medical: Not on file   Tobacco Use     Smoking status: Never Smoker     Smokeless tobacco: Never Used   Substance and Sexual Activity     Alcohol use: Yes     Comment: social     Drug use: Never     Sexual activity: Not on file   Lifestyle     Physical activity:     Days per week: Not on file     Minutes per session: Not on file     Stress: Not on file   Relationships     Social connections:     Talks on phone: Not on file     Gets together: Not on file     Attends Orthodox service: Not on file     Active member of club or organization: Not on file     Attends meetings of clubs or organizations: Not on file     Relationship status: Not on file     Intimate  partner violence:     Fear of current or ex partner: Not on file     Emotionally abused: Not on file     Physically abused: Not on file     Forced sexual activity: Not on file   Other Topics Concern     Not on file   Social History Narrative     Not on file       No family history on file.  Objective:      Physical Exam  Vitals:    11/27/19 1157   BP: 119/74   Pulse: 84   Temp: 98.1  F (36.7  C)     General - well developed, well nourished, appropriate for age. Appears no distress at this time  Abdomen - slender soft, non-tender, no hepatosplenomegaly, no masses.   - no flank tenderness, no suprapubic tenderness, kidney and bladder non-palpable  MSK - normal spinal curvature. no spinal tenderness. normal gait. muscular strength intact.  Psych - oriented to time, place, and person, normal mood and affect.      Labs   Urinalysis POC (Office):  Nitrite, UA   Date Value Ref Range Status   11/27/2019 Negative Negative Final       Lab Urinalysis:  Blood, UA   Date Value Ref Range Status   11/27/2019 Large (!) Negative Final     Nitrite, UA   Date Value Ref Range Status   11/27/2019 Negative Negative Final     Leukocytes, UA   Date Value Ref Range Status   11/27/2019 Small (!) Negative Final     pH, UA   Date Value Ref Range Status   11/27/2019 7.0 5.0 - 8.0 Final

## 2021-06-03 NOTE — ANESTHESIA PREPROCEDURE EVALUATION
Anesthesia Evaluation      Patient summary reviewed     Airway   Mallampati: III  Neck ROM: full   Pulmonary - negative ROS and normal exam   (-) asthma, not a smoker                         Cardiovascular - negative ROS and normal exam  Exercise tolerance: > or = 4 METS   Neuro/Psych    (+) chronic pain    Endo/Other - negative ROS      GI/Hepatic/Renal    Chronic renal disease: stones.     Other findings:     NPO > 8 hrs      Dental - normal exam                        Anesthesia Plan  Planned anesthetic: general endotracheal    Propofol gtt    ASA 2   Induction: intravenous   Anesthetic plan and risks discussed with: patient  Anesthesia plan special considerations: antiemetics,   Post-op plan: routine recovery

## 2021-06-03 NOTE — PATIENT INSTRUCTIONS - HE
Patient Stated Goal: Prevent further stones  Cystoscopy with Stent Removal    Cystoscopy is used to help diagnose urinary problems, or to remove a ureteral stent.    During a cystoscopy, your doctor examines the inside of your bladder with an instrument called a cystoscope. A cystoscope is a long, thin flexible tube with a camera at the end.    Your doctor will insert the scope into your urethra allowing him to visualize and evaluate the inside of the bladder for possible abnormalities. The urethra is the tube that carries urine to the outside of your body.    How is the stent removed?    Your stent will be removed in the [facility] with a small telescope and a grasping tool.  It usually takes less than 1 minute to remove the stent.    What should I expect after the stent is removed?     You should feel normal by the next day.    Some patients find:      An increase in back pain about an hour after the stent is removed as the kidney fills up with urine before it starts to empty.  It can be as uncomfortable as your initial stone episode.  Taking pain medications before stent removal may be helpful, but you would need someone else to drive you to and from your appointment.    Bladder symptoms usually disappear by the next morning.    Small amounts of blood in the urine may be seen occasionally for up to a week.    At Home:      It is important to drink plenty of fluids after your procedure    You may continue to use your pain medications as prescribed    What symptoms should I watch for?    Fever     Chills    Increasing back pain that is not relieved with pain medications    Large amounts of blood in the urine or large clots    Leakage of urine (incontinence)     Are not able to urinate for 8 hours    These symptoms may mean you have a blockage or infection. Call the [facility] 24 hours a day at [phone] immediately.

## 2021-06-04 VITALS — BODY MASS INDEX: 25.55 KG/M2 | HEIGHT: 67 IN | WEIGHT: 162.8 LBS

## 2021-06-04 NOTE — PATIENT INSTRUCTIONS - HE
Calcium Oxalate Stone Prevention Self Management    Drink more fluids:    Drinking more liquids is the best way you can help prevent future stones. Stones can form when substances in the urine are too concentrated. The more you drink, the more urine you will make. This means all substances in the urine will be less concentrated.    How much urine should I be producing?    The usual recommended daily urine production is about 2 to 3 quarts (2208-7816 ml). If you are producing more than 3 quarts of urine on a regular basis, it is possible to deplete important minerals stored in the body.    To measure the amount of urine you produce in a day, you can either:    Collect all urine in a container and measure at the end of the day     Use a measuring cup each time you urinate and add up the amounts at the end of the day     Observe    Color - Dark rosa isela urine is concentrated. Light straw color or lighter is dilute and desirable     Odor - Concentrated urine tends to smell stronger. Dilute urine is nearly odorless    Ways to increase your fluid intake    Increasing the amount of fluid you drink is effective for all types of kidney stones. While water is commonly recommended, all fluids are effective for increasing the amount of urine your body produces.    Focus on starting a lifelong habit, rather than a short-term solution.     Keep liquids on hand that you like. Crystal Light is a low calorie appropriate choice.    Drink out of larger glasses. You'll tend to drink more with each serving.     Have an additional glass of fluid with each meal.     Keep a water or drink bottle at work and fill it regularly.     *If you are prone to fluid retention, consult your doctor before making changes to your fluid habits.    Low Oxalate Diet:    Avoid excess amounts or daily consumption of these foods:    All nuts and nut products including peanuts, almonds, pecans, peanut butter, almond milk    Rhubarb    Chocolate    Soybeans and  soy products     Spinach    Wheat Germ    Beets    Maintain a normal calcium diet:    Researches have found that people with low calcium intakes tend to have more stones. Foods with high calcium content are acceptable and include:    Dairy products (including milk, cheese and yogurt)    Meat and fish    Enriched cereals    Dark green vegetables    What about calcium supplements?     Many people take calcium supplements, either on their own or as prescribed by a doctor. Research has indicated that calcium supplements do not usually pose a risk for stone formation.  Calcium citrate is a better choice for a supplement.    Avoid excess salt:    Salt (sodium chloride) is found in abundance in many foods. High sodium levels in the urine can interfere with the kidney's handling of calcium.     Tips for reducing the salt in your diet:    Don't use salt at the table    Reduce the salt used in food preparation. Try 1/2 teaspoon when recipes call for 1 teaspoon.    Use herbs and spices for flavoring instead of salt.    Avoid salty foods.    Check the label before you buy or use a product. Note sodium and portion size information.    Try to consume less than 2,000 mg/day. (1 teaspoon = 2,000 mg)    Foods with high sodium content include:    Processed meat (including luncheon meats, sausage)     Crackers     Instant cereal     Processed cheese     Canned soups     Chips and snack foods     Soy sauce    [facility] can respond to your questions or concerns 24 hours a day at [phone].

## 2021-06-04 NOTE — PROGRESS NOTES
Assessment/Plan:        Diagnoses and all orders for this visit:    Calculus of ureter  -     Urinalysis Macroscopic    Left flank pain  -     NM Renogram W Lasix; Future; Expected date: 12/18/2019    Stone Management Plan  KS Stone Management 12/18/2019   Urinary Tract Infection No suspicion of infection   Renal Colic Well controlled symptoms   Renal Failure No suspicion of renal failure   Current CT date 12/18/2019   Right sided stones? No   R Stone Event No current event   Left sided stones? Yes   L Number of ureteral stones No ureteral stones   L Hydronephrosis None   L Stone Event Resolved event   Resolved date 12/18/2019   Post-op status No residual stone         Subjective:      HPI  Mr. Steven Rubio is a 34 y.o.  male returning to the [facility] [place] for late postoperative follow-up.     He returns status post Left PCNL for long standing proximal ureteral stone. He has had no unanticipated events.     He states complete resolution of previous left abdominal pain. However, he had had mild left flank pain. It is 5/10 at its worst, partially improved with ibuprofen. No provoking factors. He denies symptoms of fever, chills, flank pain, nausea, vomiting, urinary frequency and dysuria.    New CT scan was personally reviewed and demonstrates complete clearance of targeted stone  with resolution of previous hydronephrosis.     Stone composition was 90 % calcium oxalate and 10 % calcium phosphate (apatite).     PLAN    33 yo  M first time calcium based stone former s/p left PCNL for clearance of longstanding > 2 cm proximal ureteral stone.    Will obtain lasix renogram to evaluate for occult obstruction.    He is a low risk first time stone former and was educated on conservative strategies for risk reduction.    Patient also seen and examined by ZENA Cortés   Review of systems is negative except for HPI.    Past Medical History:   Diagnosis  Date     Abdominal pain      Back pain      Constipation      Corneal opacity acute low back pain     Glaucoma      Neck pain      Shoulder pain, right      Past Surgical History:   Procedure Laterality Date     IR NEPHROLITHOTOMY  11/18/2019     NEPHROLITHOTOMY Left 11/18/2019    Procedure: CYSTOSCOPY/LEFT PERCUTANEOUS NEPHROLITHOTOMY;  Surgeon: Diego Pitts MD;  Location: [facility];  Service: Urology       Current Outpatient Medications   Medication Sig Dispense Refill     acetaminophen (TYLENOL) 500 MG tablet Take 2 tablets (1,000 mg total) by mouth every 6 (six) hours as needed for pain.  0     IBUPROFEN ORAL Take by mouth.       latanoprost (XALATAN) 0.005 % ophthalmic solution Administer 1 drop into the left eye at bedtime.       nabumetone (RELAFEN) 750 MG tablet Take 750 mg by mouth 2 (two) times a day.       No current facility-administered medications for this visit.        No Known Allergies    Social History     Socioeconomic History     Marital status: Single     Spouse name: Not on file     Number of children: Not on file     Years of education: Not on file     Highest education level: Not on file   Occupational History     Not on file   Social Needs     Financial resource strain: Not on file     Food insecurity:     Worry: Not on file     Inability: Not on file     Transportation needs:     Medical: Not on file     Non-medical: Not on file   Tobacco Use     Smoking status: Never Smoker     Smokeless tobacco: Never Used   Substance and Sexual Activity     Alcohol use: Yes     Comment: social     Drug use: Never     Sexual activity: Not on file   Lifestyle     Physical activity:     Days per week: Not on file     Minutes per session: Not on file     Stress: Not on file   Relationships     Social connections:     Talks on phone: Not on file     Gets together: Not on file     Attends [employer] service: Not on file     Active member of club or organization: Not on file     Attends  meetings of clubs or organizations: Not on file     Relationship status: Not on file     Intimate partner violence:     Fear of current or ex partner: Not on file     Emotionally abused: Not on file     Physically abused: Not on file     Forced sexual activity: Not on file   Other Topics Concern     Not on file   Social History Narrative     Not on file       History reviewed. No pertinent family history.  Objective:      Physical Exam  Vitals:    12/18/19 1559   BP: 138/75   Pulse: 75   Temp: 97.7  F (36.5  C)     General - well developed, well nourished, appropriate for age. Appears no distress at this time  Abdomen - slender soft, non-tender, no hepatosplenomegaly, no masses.   - no flank tenderness, no suprapubic tenderness, kidney and bladder non-palpable  MSK - normal spinal curvature. no spinal tenderness. normal gait. muscular strength intact.  Psych - oriented to time, place, and person, normal mood and affect.    Labs   Urinalysis POC (Office):  Nitrite, UA   Date Value Ref Range Status   12/18/2019 Negative Negative Final   11/27/2019 Negative Negative Final       Lab Urinalysis:  Blood, UA   Date Value Ref Range Status   12/18/2019 Negative Negative Final   11/27/2019 Large (!) Negative Final     Nitrite, UA   Date Value Ref Range Status   12/18/2019 Negative Negative Final   11/27/2019 Negative Negative Final     Leukocytes, UA   Date Value Ref Range Status   12/18/2019 Negative Negative Final   11/27/2019 Small (!) Negative Final     pH, UA   Date Value Ref Range Status   12/18/2019 7.0 5.0 - 8.0 Final   11/27/2019 7.0 5.0 - 8.0 Final     Diego WALDRON, personally saw and examined the patient, reviewed most recent labs and imaging and agree with the above assessment

## 2021-06-12 ENCOUNTER — HEALTH MAINTENANCE LETTER (OUTPATIENT)
Age: 36
End: 2021-06-12

## 2021-10-02 ENCOUNTER — HEALTH MAINTENANCE LETTER (OUTPATIENT)
Age: 36
End: 2021-10-02

## 2022-07-09 ENCOUNTER — HEALTH MAINTENANCE LETTER (OUTPATIENT)
Age: 37
End: 2022-07-09

## 2022-09-03 ENCOUNTER — HEALTH MAINTENANCE LETTER (OUTPATIENT)
Age: 37
End: 2022-09-03

## 2023-05-19 ENCOUNTER — MEDICAL CORRESPONDENCE (OUTPATIENT)
Dept: HEALTH INFORMATION MANAGEMENT | Facility: CLINIC | Age: 38
End: 2023-05-19
Payer: COMMERCIAL

## 2023-05-24 ENCOUNTER — TRANSCRIBE ORDERS (OUTPATIENT)
Dept: OTHER | Age: 38
End: 2023-05-24

## 2024-02-16 ENCOUNTER — PATIENT OUTREACH (OUTPATIENT)
Dept: CARE COORDINATION | Facility: CLINIC | Age: 39
End: 2024-02-16
Payer: COMMERCIAL

## 2024-03-01 ENCOUNTER — PATIENT OUTREACH (OUTPATIENT)
Dept: CARE COORDINATION | Facility: CLINIC | Age: 39
End: 2024-03-01
Payer: COMMERCIAL

## 2024-04-27 ENCOUNTER — HEALTH MAINTENANCE LETTER (OUTPATIENT)
Age: 39
End: 2024-04-27